# Patient Record
Sex: FEMALE | Race: WHITE | Employment: FULL TIME | URBAN - METROPOLITAN AREA
[De-identification: names, ages, dates, MRNs, and addresses within clinical notes are randomized per-mention and may not be internally consistent; named-entity substitution may affect disease eponyms.]

---

## 2018-01-18 ENCOUNTER — OFFICE VISIT (OUTPATIENT)
Dept: ORTHOPEDIC SURGERY | Age: 23
End: 2018-01-18

## 2018-01-18 VITALS
WEIGHT: 150 LBS | HEART RATE: 76 BPM | DIASTOLIC BLOOD PRESSURE: 89 MMHG | SYSTOLIC BLOOD PRESSURE: 135 MMHG | BODY MASS INDEX: 22.22 KG/M2 | HEIGHT: 69 IN

## 2018-01-18 DIAGNOSIS — S16.1XXA STRAIN OF NECK MUSCLE, INITIAL ENCOUNTER: ICD-10-CM

## 2018-01-18 DIAGNOSIS — M25.511 RIGHT SHOULDER PAIN, UNSPECIFIED CHRONICITY: ICD-10-CM

## 2018-01-18 DIAGNOSIS — S46.911A STRAIN OF RIGHT SHOULDER, INITIAL ENCOUNTER: Primary | ICD-10-CM

## 2018-01-18 PROCEDURE — 99203 OFFICE O/P NEW LOW 30 MIN: CPT | Performed by: ORTHOPAEDIC SURGERY

## 2018-01-18 RX ORDER — CYCLOBENZAPRINE HCL 10 MG
10 TABLET ORAL 3 TIMES DAILY PRN
Qty: 20 TABLET | Refills: 0 | Status: SHIPPED | OUTPATIENT
Start: 2018-01-18 | End: 2018-01-28

## 2018-01-18 RX ORDER — METHYLPREDNISOLONE 4 MG/1
TABLET ORAL
Qty: 1 KIT | Refills: 0 | Status: SHIPPED | OUTPATIENT
Start: 2018-01-18 | End: 2018-01-24

## 2018-01-18 NOTE — PROGRESS NOTES
Michael Strickland  [id number]  January 18, 2018    Chief Complaint   Patient presents with    Shoulder Pain     Right shoulder pain after waking up this morning,          History: The patient is a  35-year-old female here today for evaluation regarding her right shoulder/neck pain. The patient reportedly woke up this morning in severe pain and she was unable to move her neck right shoulder. She is a right-hand dominant individual.  She works in a [employer]. She did take 600 mg ibuprofen this morning and reports this has started to improve her pain. She rates her pain 6/10 today. She denies any associated numbness, tingling, or weakness. She has not had any of his symptoms previously. The patient's  past medical history, medications, allergies,  family history, social history, and review of systems have been reviewed, and dated and are recorded in the chart. /89   Pulse 76   Ht 5' 9\" (1.753 m)   Wt 150 lb (68 kg)   Breastfeeding? No   BMI 22.15 kg/m²     Physical: The patient presents today in no acute distress. She is alert and oriented to person, place and time. She displays appropriate mood and affect. She is well dressed, nourished and  groomed. She has a normal affect. Examination of the neck reveals no evidence of tenderness. Spurling's sign is negative. There is a diffuse tenderness to palpation about her right trapezius musculature. Her cervical range of motion is slightly decreased secondary to pain in all planes. Active range of motion of the right shoulder is: 175 degrees abduction, 175 degrees forward flexion, 45 degrees of external rotation and internal rotation to L1. Passive range of motion of the right shoulder is: 180 degrees abduction, 180 degrees forward flexion, 50 degrees of external rotation and internal rotation to T11. Active and passive range of motion of the opposite shoulder is full.  Examination of the right shoulder reveals negative Neer and Camacho' impingement

## 2019-05-27 ENCOUNTER — HOSPITAL ENCOUNTER (INPATIENT)
Age: 24
LOS: 6 days | Discharge: HOME | DRG: 866 | End: 2019-06-02
Attending: EMERGENCY MEDICINE | Admitting: INTERNAL MEDICINE
Payer: COMMERCIAL

## 2019-05-27 ENCOUNTER — APPOINTMENT (OUTPATIENT)
Dept: GENERAL RADIOLOGY | Age: 24
DRG: 866 | End: 2019-05-27
Payer: COMMERCIAL

## 2019-05-27 DIAGNOSIS — R50.9 FEVER, UNSPECIFIED FEVER CAUSE: Primary | ICD-10-CM

## 2019-05-27 PROBLEM — R51.9 HEADACHE: Status: ACTIVE | Noted: 2019-05-27

## 2019-05-27 PROBLEM — R53.83 FATIGUE: Status: ACTIVE | Noted: 2019-05-27

## 2019-05-27 PROBLEM — R11.0 NAUSEA: Status: ACTIVE | Noted: 2019-05-27

## 2019-05-27 LAB
A/G RATIO: 1.4 (ref 1.1–2.2)
ALBUMIN SERPL-MCNC: 3.8 G/DL (ref 3.4–5)
ALP BLD-CCNC: 97 U/L (ref 40–129)
ALT SERPL-CCNC: 62 U/L (ref 10–40)
ANION GAP SERPL CALCULATED.3IONS-SCNC: 10 MMOL/L (ref 3–16)
AST SERPL-CCNC: 76 U/L (ref 15–37)
BASOPHILS ABSOLUTE: 0 K/UL (ref 0–0.2)
BASOPHILS RELATIVE PERCENT: 0.4 %
BILIRUB SERPL-MCNC: 0.5 MG/DL (ref 0–1)
BILIRUBIN URINE: NEGATIVE
BLOOD, URINE: NEGATIVE
BUN BLDV-MCNC: 8 MG/DL (ref 7–20)
CALCIUM SERPL-MCNC: 8.8 MG/DL (ref 8.3–10.6)
CHLORIDE BLD-SCNC: 104 MMOL/L (ref 99–110)
CLARITY: CLEAR
CO2: 20 MMOL/L (ref 21–32)
COLOR: YELLOW
CREAT SERPL-MCNC: <0.5 MG/DL (ref 0.6–1.1)
EOSINOPHILS ABSOLUTE: 0 K/UL (ref 0–0.6)
EOSINOPHILS RELATIVE PERCENT: 0 %
GFR AFRICAN AMERICAN: >60
GFR NON-AFRICAN AMERICAN: >60
GLOBULIN: 2.8 G/DL
GLUCOSE BLD-MCNC: 164 MG/DL (ref 70–99)
GLUCOSE URINE: NEGATIVE MG/DL
HCG QUALITATIVE: NEGATIVE
HCT VFR BLD CALC: 36.9 % (ref 36–48)
HEMOGLOBIN: 12.7 G/DL (ref 12–16)
KETONES, URINE: NEGATIVE MG/DL
LACTIC ACID, SEPSIS: 0.8 MMOL/L (ref 0.4–1.9)
LACTIC ACID, SEPSIS: 0.9 MMOL/L (ref 0.4–1.9)
LEUKOCYTE ESTERASE, URINE: NEGATIVE
LYMPHOCYTES ABSOLUTE: 0.5 K/UL (ref 1–5.1)
LYMPHOCYTES RELATIVE PERCENT: 19 %
MCH RBC QN AUTO: 30.2 PG (ref 26–34)
MCHC RBC AUTO-ENTMCNC: 34.4 G/DL (ref 31–36)
MCV RBC AUTO: 88 FL (ref 80–100)
MICROSCOPIC EXAMINATION: NORMAL
MONOCYTES ABSOLUTE: 0.1 K/UL (ref 0–1.3)
MONOCYTES RELATIVE PERCENT: 4.8 %
NEUTROPHILS ABSOLUTE: 1.8 K/UL (ref 1.7–7.7)
NEUTROPHILS RELATIVE PERCENT: 75.8 %
NITRITE, URINE: NEGATIVE
PDW BLD-RTO: 12.4 % (ref 12.4–15.4)
PH UA: 6.5 (ref 5–8)
PLATELET # BLD: 109 K/UL (ref 135–450)
PMV BLD AUTO: 8.5 FL (ref 5–10.5)
POTASSIUM SERPL-SCNC: 3.8 MMOL/L (ref 3.5–5.1)
PROTEIN UA: NEGATIVE MG/DL
RBC # BLD: 4.19 M/UL (ref 4–5.2)
SODIUM BLD-SCNC: 134 MMOL/L (ref 136–145)
SPECIFIC GRAVITY UA: 1.01 (ref 1–1.03)
TOTAL PROTEIN: 6.6 G/DL (ref 6.4–8.2)
URINE REFLEX TO CULTURE: NORMAL
URINE TYPE: NORMAL
UROBILINOGEN, URINE: 1 E.U./DL
WBC # BLD: 2.4 K/UL (ref 4–11)

## 2019-05-27 PROCEDURE — 99255 IP/OBS CONSLTJ NEW/EST HI 80: CPT | Performed by: INTERNAL MEDICINE

## 2019-05-27 PROCEDURE — 36415 COLL VENOUS BLD VENIPUNCTURE: CPT

## 2019-05-27 PROCEDURE — 84703 CHORIONIC GONADOTROPIN ASSAY: CPT

## 2019-05-27 PROCEDURE — 2580000003 HC RX 258: Performed by: INTERNAL MEDICINE

## 2019-05-27 PROCEDURE — 6370000000 HC RX 637 (ALT 250 FOR IP): Performed by: INTERNAL MEDICINE

## 2019-05-27 PROCEDURE — 85025 COMPLETE CBC W/AUTO DIFF WBC: CPT

## 2019-05-27 PROCEDURE — 99285 EMERGENCY DEPT VISIT HI MDM: CPT

## 2019-05-27 PROCEDURE — 96374 THER/PROPH/DIAG INJ IV PUSH: CPT

## 2019-05-27 PROCEDURE — 1200000000 HC SEMI PRIVATE

## 2019-05-27 PROCEDURE — 83605 ASSAY OF LACTIC ACID: CPT

## 2019-05-27 PROCEDURE — 81003 URINALYSIS AUTO W/O SCOPE: CPT

## 2019-05-27 PROCEDURE — 87040 BLOOD CULTURE FOR BACTERIA: CPT

## 2019-05-27 PROCEDURE — 96361 HYDRATE IV INFUSION ADD-ON: CPT

## 2019-05-27 PROCEDURE — 6360000002 HC RX W HCPCS: Performed by: NURSE PRACTITIONER

## 2019-05-27 PROCEDURE — 71046 X-RAY EXAM CHEST 2 VIEWS: CPT

## 2019-05-27 PROCEDURE — 6360000002 HC RX W HCPCS: Performed by: INTERNAL MEDICINE

## 2019-05-27 PROCEDURE — 6370000000 HC RX 637 (ALT 250 FOR IP): Performed by: NURSE PRACTITIONER

## 2019-05-27 PROCEDURE — 2580000003 HC RX 258: Performed by: NURSE PRACTITIONER

## 2019-05-27 PROCEDURE — 87207 SMEAR SPECIAL STAIN: CPT

## 2019-05-27 PROCEDURE — 80053 COMPREHEN METABOLIC PANEL: CPT

## 2019-05-27 RX ORDER — SODIUM CHLORIDE 0.9 % (FLUSH) 0.9 %
10 SYRINGE (ML) INJECTION PRN
Status: DISCONTINUED | OUTPATIENT
Start: 2019-05-27 | End: 2019-06-02 | Stop reason: HOSPADM

## 2019-05-27 RX ORDER — OSELTAMIVIR PHOSPHATE 75 MG/1
75 CAPSULE ORAL 2 TIMES DAILY
Status: ON HOLD | COMMUNITY
End: 2019-06-02 | Stop reason: HOSPADM

## 2019-05-27 RX ORDER — MORPHINE SULFATE 2 MG/ML
2 INJECTION, SOLUTION INTRAMUSCULAR; INTRAVENOUS EVERY 4 HOURS PRN
Status: COMPLETED | OUTPATIENT
Start: 2019-05-27 | End: 2019-05-28

## 2019-05-27 RX ORDER — KETOROLAC TROMETHAMINE 30 MG/ML
30 INJECTION, SOLUTION INTRAMUSCULAR; INTRAVENOUS EVERY 6 HOURS PRN
Status: DISCONTINUED | OUTPATIENT
Start: 2019-05-27 | End: 2019-05-28

## 2019-05-27 RX ORDER — ACETAMINOPHEN 325 MG/1
650 TABLET ORAL EVERY 4 HOURS PRN
Status: DISCONTINUED | OUTPATIENT
Start: 2019-05-27 | End: 2019-05-28

## 2019-05-27 RX ORDER — KETOROLAC TROMETHAMINE 30 MG/ML
30 INJECTION, SOLUTION INTRAMUSCULAR; INTRAVENOUS ONCE
Status: COMPLETED | OUTPATIENT
Start: 2019-05-27 | End: 2019-05-27

## 2019-05-27 RX ORDER — SODIUM CHLORIDE 0.9 % (FLUSH) 0.9 %
10 SYRINGE (ML) INJECTION EVERY 12 HOURS SCHEDULED
Status: DISCONTINUED | OUTPATIENT
Start: 2019-05-27 | End: 2019-06-02 | Stop reason: HOSPADM

## 2019-05-27 RX ORDER — ACETAMINOPHEN 500 MG
1000 TABLET ORAL ONCE
Status: COMPLETED | OUTPATIENT
Start: 2019-05-27 | End: 2019-05-27

## 2019-05-27 RX ORDER — 0.9 % SODIUM CHLORIDE 0.9 %
30 INTRAVENOUS SOLUTION INTRAVENOUS ONCE
Status: COMPLETED | OUTPATIENT
Start: 2019-05-27 | End: 2019-05-27

## 2019-05-27 RX ORDER — ONDANSETRON 2 MG/ML
4 INJECTION INTRAMUSCULAR; INTRAVENOUS EVERY 6 HOURS PRN
Status: DISCONTINUED | OUTPATIENT
Start: 2019-05-27 | End: 2019-06-02 | Stop reason: HOSPADM

## 2019-05-27 RX ADMIN — Medication 10 ML: at 10:03

## 2019-05-27 RX ADMIN — KETOROLAC TROMETHAMINE 30 MG: 30 INJECTION, SOLUTION INTRAMUSCULAR; INTRAVENOUS at 15:14

## 2019-05-27 RX ADMIN — KETOROLAC TROMETHAMINE 30 MG: 30 INJECTION, SOLUTION INTRAMUSCULAR at 03:22

## 2019-05-27 RX ADMIN — ACETAMINOPHEN 650 MG: 325 TABLET, FILM COATED ORAL at 18:21

## 2019-05-27 RX ADMIN — MORPHINE SULFATE 2 MG: 2 INJECTION, SOLUTION INTRAMUSCULAR; INTRAVENOUS at 19:41

## 2019-05-27 RX ADMIN — KETOROLAC TROMETHAMINE 30 MG: 30 INJECTION, SOLUTION INTRAMUSCULAR; INTRAVENOUS at 23:26

## 2019-05-27 RX ADMIN — Medication 10 ML: at 15:14

## 2019-05-27 RX ADMIN — SODIUM CHLORIDE 2040 ML: 9 INJECTION, SOLUTION INTRAVENOUS at 03:21

## 2019-05-27 RX ADMIN — Medication 10 ML: at 19:41

## 2019-05-27 RX ADMIN — ACETAMINOPHEN 1000 MG: 500 TABLET, FILM COATED ORAL at 09:28

## 2019-05-27 ASSESSMENT — ENCOUNTER SYMPTOMS
NAUSEA: 0
CHEST TIGHTNESS: 0
ABDOMINAL PAIN: 0
COUGH: 1
VOMITING: 0
DIARRHEA: 0
SHORTNESS OF BREATH: 0
PHOTOPHOBIA: 1

## 2019-05-27 ASSESSMENT — PAIN SCALES - GENERAL
PAINLEVEL_OUTOF10: 4
PAINLEVEL_OUTOF10: 6
PAINLEVEL_OUTOF10: 0
PAINLEVEL_OUTOF10: 10
PAINLEVEL_OUTOF10: 6
PAINLEVEL_OUTOF10: 6
PAINLEVEL_OUTOF10: 10
PAINLEVEL_OUTOF10: 7

## 2019-05-27 ASSESSMENT — PAIN DESCRIPTION - PAIN TYPE: TYPE: ACUTE PAIN

## 2019-05-27 ASSESSMENT — PAIN DESCRIPTION - DESCRIPTORS: DESCRIPTORS: ACHING;CONSTANT

## 2019-05-27 ASSESSMENT — PAIN DESCRIPTION - LOCATION: LOCATION: GENERALIZED

## 2019-05-27 NOTE — PROGRESS NOTES
Vitals assessed; temp low grade. PRN Tylenol given for \"body aches\". Patient denies any needs. Call light within reach.

## 2019-05-27 NOTE — ED PROVIDER NOTES
[address]        Pt Name: Nga Dye  MRN: [id number]  [place] 1995  Date of evaluation: 5/27/2019  Provider: KAREN Vanessa CNP  PCP: No primary care provider on file. This patient was seen and evaluated by the attending physician Kyler Avitia MD.      [address] [place] [address]       Chief Complaint   Patient presents with    Fever     Patient presents to ER with complaints of fever and flu-like symptoms. Recently seen at urgent care and given oseltamivir. HISTORY OF PRESENT ILLNESS   (Location/Symptom, Timing/Onset, Context/Setting, Quality, Duration, Modifying Factors, Severity)  Note limiting factors. Nga Dye is a 21 y.o. female presents to the emergency department with complaint of fever, headaches, chills, and fatigue for 3 days. Patient was seen at urgent care and prescribed Tamiflu without a nasal swab recently. States that this medication is making her feel worse. She did travel back from [place] 2 months ago and has been feeling well up until the past 1-1/2 weeks. She reports photophobia without visual disturbance or aura. States that her headache does wax and wane, worse with fever. Burning in her eyes. Denies neck pain or meningeal symptoms. Denies any lightheadedness, dizziness, visual disturbances. No chest pain or pressure. No neck or back pain. No shortness of breath. No abdominal pain, nausea, vomiting, diarrhea, constipation, or dysuria. No rash. Nursing Notes were all reviewed and agreed with or any disagreements were addressed  in the HPI. REVIEW OF SYSTEMS    (2-9 systems for level 4, 10 or more for level 5)     Review of Systems   Constitutional: Positive for chills, fatigue and fever. Negative for activity change. Eyes: Positive for photophobia. Respiratory: Positive for cough. Negative for chest tightness and shortness of breath. Cardiovascular: Negative for chest pain. Gastrointestinal: Negative for abdominal pain, diarrhea, nausea and vomiting. Genitourinary: Negative for dysuria. Neurological: Positive for headaches. All other systems reviewed and are negative. Positives and Pertinent negatives as per HPI. Except as noted abovein the ROS, all other systems were reviewed and negative. PAST MEDICAL HISTORY   History reviewed. No pertinent past medical history. SURGICAL HISTORY   History reviewed. No pertinent surgical history. [address]       Current Discharge Medication List      CONTINUE these medications which have NOT CHANGED    Details   oseltamivir (TAMIFLU) 75 MG capsule Take 75 mg by mouth 2 times daily               ALLERGIES     Patient has no known allergies. FAMILYHISTORY     History reviewed. No pertinent family history.        SOCIAL HISTORY       Social History     Socioeconomic History    Marital status:      Spouse name: None    Number of children: None    Years of education: None    Highest education level: None   Occupational History    None   Social Needs    Financial resource strain: None    Food insecurity:     Worry: None     Inability: None    Transportation needs:     Medical: None     Non-medical: None   Tobacco Use    Smoking status: Never Smoker    Smokeless tobacco: Never Used   Substance and Sexual Activity    Alcohol use: None    Drug use: None    Sexual activity: None   Lifestyle    Physical activity:     Days per week: None     Minutes per session: None    Stress: None   Relationships    Social connections:     Talks on phone: None     Gets together: None     Attends Yazdanism service: None     Active member of club or organization: None     Attends meetings of clubs or organizations: None     Relationship status: None    Intimate partner violence:     Fear of current or ex partner: None     Emotionally abused: None     Physically abused: None     Forced sexual activity: None   Other Topics Concern    None   Social History Narrative    None       SCREENINGS             PHYSICAL EXAM    (up to 7 for level 4, 8 or more for level 5)     ED Triage Vitals [05/27/19 0140]   BP Temp Temp Source Pulse Resp SpO2 Height Weight   128/87 101.9 °F (38.8 °C) Oral 130 18 97 % 5' 9\" (1.753 m) 150 lb (68 kg)       Physical Exam   Constitutional: She is oriented to person, place, and time. She appears well-developed and well-nourished. No distress. HENT:   Head: Normocephalic and atraumatic. Right Ear: External ear normal.   Left Ear: External ear normal.   Eyes: Pupils are equal, round, and reactive to light. Conjunctivae and EOM are normal. Right eye exhibits no discharge. Left eye exhibits no discharge. Neck: Normal range of motion. Neck supple. No JVD present. Cardiovascular: Normal rate and regular rhythm. Exam reveals no friction rub. No murmur heard. Pulmonary/Chest: Effort normal and breath sounds normal. No stridor. No respiratory distress. She has no wheezes. Abdominal: Soft. She exhibits no mass. There is no tenderness. Musculoskeletal: Normal range of motion. Neurological: She is alert and oriented to person, place, and time. She has normal strength. No cranial nerve deficit or sensory deficit. GCS eye subscore is 4. GCS verbal subscore is 5. GCS motor subscore is 6. Skin: Skin is warm and dry. She is not diaphoretic. No pallor. Psychiatric: She has a normal mood and affect. Her behavior is normal.   Nursing note and vitals reviewed.       DIAGNOSTIC RESULTS   LABS:    Labs Reviewed   CBC WITH AUTO DIFFERENTIAL - Abnormal; Notable for the following components:       Result Value    WBC 2.4 (*)     Platelets 583 (*)     Lymphocytes # 0.5 (*)     All other components within normal limits    Narrative:     Performed at:  [facility] [address], [place], [address]   Phone [phone]   COMPREHENSIVE METABOLIC PANEL - Abnormal; Notable for noted below, none     Procedures    CRITICAL CARE TIME   The total critical care time spent while evaluating and treating this patient was at least 32 minutes. This excludes time spent doing separately billable procedures. This includes time at the bedside, data interpretation, medication management, obtaining critical history from collateral sources if the patient is unable to provide it directly, and physician consultation. Specifics of interventions taken and potentially life-threatening diagnostic considerations are listed above in the medical decision making.       CONSULTS:  IP CONSULT TO INFECTIOUS DISEASES      EMERGENCY DEPARTMENT COURSE and DIFFERENTIALDIAGNOSIS/MDM:   Vitals:    Vitals:    05/27/19 0949 05/27/19 1000 05/27/19 1359 05/27/19 1818   BP: 111/75 111/75 112/78 115/77   Pulse: 103 103 96 99   Resp: 16  16 16   Temp: 101.9 °F (38.8 °C)  99 °F (37.2 °C) 100.2 °F (37.9 °C)   TempSrc: Oral  Oral Oral   SpO2:   99% 99%   Weight:       Height:           Patient was given thefollowing medications:  Medications   sodium chloride flush 0.9 % injection 10 mL (10 mLs Intravenous Given 5/27/19 1003)   sodium chloride flush 0.9 % injection 10 mL (10 mLs Intravenous Given 5/27/19 1514)   magnesium hydroxide (MILK OF MAGNESIA) 400 MG/5ML suspension 30 mL (has no administration in time range)   ondansetron (ZOFRAN) injection 4 mg (has no administration in time range)   enoxaparin (LOVENOX) injection 40 mg (has no administration in time range)   acetaminophen (TYLENOL) tablet 650 mg (650 mg Oral Given 5/27/19 1821)   ketorolac (TORADOL) injection 30 mg (30 mg Intravenous Given 5/27/19 1514)   morphine (PF) injection 2 mg (has no administration in time range)   0.9 % sodium chloride bolus (0 mL/kg × 68 kg Intravenous Stopped 5/27/19 1486)   ketorolac (TORADOL) injection 30 mg (30 mg Intravenous Given 5/27/19 7132)   acetaminophen (TYLENOL) tablet 1,000 mg (1,000 mg Oral Given 5/27/19 0928)       Briefly, this

## 2019-05-27 NOTE — ED PROVIDER NOTES
Alcohol use: None    Drug use: None    Sexual activity: None   Lifestyle    Physical activity:     Days per week: None     Minutes per session: None    Stress: None   Relationships    Social connections:     Talks on phone: None     Gets together: None     Attends Yazidism service: None     Active member of club or organization: None     Attends meetings of clubs or organizations: None     Relationship status: None    Intimate partner violence:     Fear of current or ex partner: None     Emotionally abused: None     Physically abused: None     Forced sexual activity: None   Other Topics Concern    None   Social History Narrative    None       REVIEW OF SYSTEMS    Constitutional:  Fever and chills  Eyes:  Denies photophobia or discharge   HENT:  Denies sore throat or ear pain   Respiratory:  Denies cough or shortness of breath   Cardiovascular:  Denies chest pain, palpitations or swelling   GI:  Denies abdominal pain, nausea, vomiting, or diarrhea   Musculoskeletal:  Denies back pain   Skin:  Denies rash   Neurologic:  Denies headache, focal weakness or sensory changes   Endocrine:  Denies polyuria or polydypsia   Lymphatic:  Denies swollen glands   Psychiatric:  Denies depression, suicidal ideation or homicidal ideation   All systems negative except as marked. PHYSICAL EXAM    VITAL SIGNS: /69   Pulse 87   Temp 99 °F (37.2 °C) (Oral)   Resp 16   Ht 5' 9\" (1.753 m)   Wt 150 lb (68 kg)   LMP 04/28/2019   SpO2 97%   BMI 22.15 kg/m²    Constitutional:  Well developed, Well nourished, No acute distress, Non-toxic appearance. HENT:  Normocephalic, Atraumatic, Bilateral external ears normal, Oropharynx moist, No oral exudates, Nose normal. Neck- Normal range of motion, No tenderness, Supple, No stridor. Eyes:  PERRL, EOMI, Conjunctiva normal, No discharge. Respiratory:  Normal breath sounds, No respiratory distress, No wheezing, No chest tenderness.    Cardiovascular:  Normal heart rate, Normal rhythm, No murmurs, No rubs, No gallops. GI:  Bowel sounds normal, Soft, No tenderness, No masses, No pulsatile masses. Musculoskeletal:  Intact distal pulses, No edema, No tenderness, No cyanosis, No clubbing. Good range of motion in all major joints. No tenderness to palpation or major deformities noted. Back- No tenderness. Integument:  Warm, Dry, No erythema, No rash. Lymphatic:  No lymphadenopathy noted. Neurologic:  Alert & oriented x 3, Normal motor function, Normal sensory function, No focal deficits noted. Psychiatric:  Affect normal, Judgment normal, Mood normal.     EKG        RADIOLOGY    XR CHEST STANDARD (2 VW)   Final Result   No evidence of acute cardiopulmonary disease.                PROCEDURES    Labs Reviewed   CBC WITH AUTO DIFFERENTIAL - Abnormal; Notable for the following components:       Result Value    WBC 2.4 (*)     Platelets 521 (*)     Lymphocytes # 0.5 (*)     All other components within normal limits    Narrative:     Performed at:  [facility] [address], [place], [address]   Phone [phone]   COMPREHENSIVE METABOLIC PANEL - Abnormal; Notable for the following components:    Sodium 134 (*)     CO2 20 (*)     Glucose 164 (*)     CREATININE <0.5 (*)     ALT 62 (*)     AST 76 (*)     All other components within normal limits    Narrative:     Performed at:  [facility] [address], [place] [facility] [place]   Phone [phone]   CULTURE BLOOD #2   CULTURE BLOOD #1   MALARIA SMEAR   HCG, SERUM, QUALITATIVE    Narrative:     Performed at:  [facility] [address], [place], [address]   Phone [phone]   LACTATE, SEPSIS    Narrative:     Performed at:  [facility] [address], [place] [facility] [place]   Phone [phone]   URINE RT REFLEX TO CULTURE    Narrative:     Performed at:  [facility]

## 2019-05-27 NOTE — PROGRESS NOTES
Patient has arrived to unit in stable condition. Vitals assessed; temp elevated but has decreased since last reading. Patient is awake, alert and oriented. Respirations are easy and unlabored. Patient does not appear to be in distress. Patient reports feeling \"weak and exhausted\". Patient provided with ice water, crackers and jello. Patient oriented to room and call light. Call light within reach.

## 2019-05-27 NOTE — H&P
Hospital Medicine History & Physical      PCP: No primary care provider on file. Date of Admission: 5/27/2019    Date of Service: Pt seen/examined on 5/27/2019  and Admitted to Inpatient with expected LOS greater than two midnights due to medical therapy. Chief Complaint:    Chief Complaint   Patient presents with    Fever     Patient presents to ER with complaints of fever and flu-like symptoms. Recently seen at urgent care and given oseltamivir. History Of Present Illness: The patient is a 21 y.o. female  with no significant past medical history who presented with 5 day history of intermittent fevers and chills with associated fatigue, headaches with nausea but no vomiting. She reports the fevers to be coming twice a day almost every 12 hours. Of note she had a sore throat and rhinorrhea about a week ago which has since resolved. She reports to have for her to travel to [place] on relief mission and returned 2 months ago. She was there for 9 months. Of note 5-6 months ago she had a tick bite at which time she developed fevers with fatigue and reports to have received some treatment for that with resolution of symptoms. She did travel for about 4 days to malaria area in [place] earlier on in her 9 months mission. However she never manifested any malaria symptoms at the time. She also reports to have had a sick contact at home with flulike symptoms about a week ago  She has no rash, no diarrhea, no cough or production, no chest pains, no abdominal pains. She reports that she did not get flu shot last season as she was away. Past Medical History:    History reviewed. No pertinent past medical history. Past Surgical History:    History reviewed. No pertinent surgical history. Medications Prior to Admission:    Prior to Admission medications    Medication Sig Start Date End Date Taking?  Authorizing Provider   oseltamivir (TAMIFLU) 75 MG capsule Take 75 mg by mouth 2 times daily   Yes Historical Provider, MD       Allergies:  Patient has no known allergies. Social History:  The patient currently lives with family    TOBACCO:   reports that she has never smoked. She has never used smokeless tobacco.  ETOH:   has no alcohol history on file. Family History:  Reviewed in detail and negative for DM, Early CAD, Cancer, CVA. Positive as follows:    History reviewed. No pertinent family history. REVIEW OF SYSTEMS:   Positive for fevers and chills, fatigue, headache, nausea and as noted in the HPI. All other systems reviewed and negative. PHYSICAL EXAM:    /85   Pulse 105   Temp 103.2 °F (39.6 °C) (Oral)   Resp 16   Ht 5' 9\" (1.753 m)   Wt 150 lb (68 kg)   LMP 04/28/2019   SpO2 99%   BMI 22.15 kg/m²     General appearance: No apparent distress appears stated age and cooperative. HEENT Normal cephalic, atraumatic without obvious deformity. Pupils equal, round, and reactive to light. Extra ocular muscles intact. Conjunctivae/corneas clear. Neck: Supple, No jugular venous distention/bruits. Lungs: Clear to auscultation, bilaterally without Rales/Wheezes/Rhonchi with good respiratory effort. Heart: Regular rate and rhythm with Normal S1/S2 without murmurs, rubs or gallops  Abdomen: Soft, non-tender or non-distended without rigidity or guarding and positive bowel sounds   Extremities: No clubbing, cyanosis, or edema bilaterally  Skin: Skin color, texture, turgor normal.  No rashes or lesions. Neurologic: Alert and oriented X 3, neurovascularly intact with sensory/motor intact upper extremities/lower extremities, bilaterally. Cranial nerves: II-XII intact, grossly non-focal.  Mental status: Alert, oriented, thought content appropriate.     CXR:  I have reviewed the CXR with the following interpretation: No evidence of cardiopulmonary disease      CBC   Recent Labs     05/27/19  0326   WBC 2.4*   HGB 12.7   HCT 36.9   *      RENAL  Recent Labs 05/27/19  0326   *   K 3.8      CO2 20*   BUN 8   CREATININE <0.5*     LFT'S  Recent Labs     05/27/19  0326   AST 76*   ALT 62*   BILITOT 0.5   ALKPHOS 97     COAG  No results for input(s): INR in the last 72 hours. CARDIAC ENZYMES  No results for input(s): CKTOTAL, CKMB, CKMBINDEX, TROPONINI in the last 72 hours. U/A:    Lab Results   Component Value Date    COLORU YELLOW 05/27/2019    CLARITYU Clear 05/27/2019    SPECGRAV 1.010 05/27/2019    LEUKOCYTESUR Negative 05/27/2019    BLOODU Negative 05/27/2019    GLUCOSEU Negative 05/27/2019       ABG  No results found for: NKE8DJM, BEART, B3VGJSVQ, PHART, THGBART, SRH8EJR, PO2ART, [place]        Active Hospital Problems    Diagnosis Date Noted    Fever and chills [R50.9] 05/27/2019    Fatigue [R53.83] 05/27/2019    Headache [R51] 05/27/2019    Nausea [R11.0] 05/27/2019         ASSESSMENT/PLAN:   21 y.o. female  with no significant past medical history who presented with 5 day history of intermittent fevers and chills with associated fatigue, headaches, nausea but no vomiting the setting of travel to the malaria area giving consent for probable malaria infection. It may be a late manifestation with delayed presentation. There has been reports of vivax presenting upto 4years after leaving the endemic area. Plan:  -Request for malaria smear has been sent  -Supportive therapy for now with analgesics and antipyretics   -Infectious disease consult  -Given that symptoms have been going on for 4-5 days, I will hold off on Tamiflu.  -Antiemetics for nausea  -IV fluids for hydration and encourage oral intake      DVT Prophylaxis: Subcut in enoxaparin  Diet: DIET GENERAL;  Code Status: Full code         Paul Atkinson MD    Thank you No primary care provider on file. for the opportunity to be involved in this patient's care. If you have any questions or concerns please feel free to contact me at [phone].

## 2019-05-27 NOTE — PROGRESS NOTES
Assessment complete. VSS. Patient resting in bed. Family at bedside. Respirations even and easy. Call light in reach. Patient complaining of pain. PRN pain medication given. Patient unable to eat dinner tray. Patient requesting crackers at this time. Crackers provided to patient. No other needs expressed at this time. Will continue to monitor.

## 2019-05-27 NOTE — CONSULTS
Infectious Diseases Inpatient Consult Note    Reason for Consult:   Fever   Requesting Physician:   Dr Esha Welch  Primary Care Physician:  No primary care provider on file. History Obtained From:   Pt, EPIC    Admit Date: 5/27/2019  Hospital Day: 1    CHIEF COMPLAINT:       Chief Complaint   Patient presents with    Fever     Patient presents to ER with complaints of fever and flu-like symptoms. Recently seen at urgent care and given oseltamivir. HISTORY OF PRESENT ILLNESS:      20 yo woman with no PMH    Presents with f/c, fatigue, HA, nausea, loss of appetitie. Pt had resp congestion 1-2 week ago, resolved. Developed fever / fatigue, muscle pain 4 days ago. Mild HA  No resp sx. No abd pain. No diarrhea. No rash    Recent travel, in [place] x 9mo, returned 2 mo ago. Had unknown immunization prior to travel. No malaria prophylaxis. (5 mo ago had tick bite with spotted fever illness [fever, aches, lesion at bite, treated with doxycycline and illness resolved). + recent sick contact - friend had resp sx,  with resp sx.  2 yo in household well    Tm 103.2. WBC 2.4, plt 109k. UA neg. CXR neg. BC sent. Feels 'tired'. Past Medical History:    History reviewed. No pertinent past medical history. Past Surgical History:    History reviewed. No pertinent surgical history. Current Medications:     sodium chloride flush  10 mL Intravenous 2 times per day    [START ON 5/28/2019] enoxaparin  40 mg Subcutaneous Daily       Allergies:  Patient has no known allergies. Social History:    TOBACCO:    None   ETOH:    Infrequent   DRUGS:   None   MARITAL STATUS:    - no other sexual contacts    Family History:   No immunodeficiency    REVIEW OF SYSTEMS:    + fever / chills / sweats. No weight loss. No visual change, eye pain, eye discharge. No oral lesion, sore throat, dysphagia. Denies cough / sputum. Denies chest pain, palpitations. Denies n / v / abd pain.   No diarrhea. Denies dysuria or change in urinary function. Denies joint swelling or pain.  + myalgia  Denies skin changes, itching  Denies focal weakness, sensory change or other neurologic symptom    Denies new / worse depression, psychiatric symptoms    PHYSICAL EXAM:      Vitals:    /78   Pulse 96   Temp 99 °F (37.2 °C) (Oral)   Resp 16   Ht 5' 9\" (1.753 m)   Wt 150 lb (68 kg)   LMP 2019   SpO2 99%   BMI 22.15 kg/m²     GENERAL: No apparent distress. HEENT: Membranes moist, no oral lesion  NECK:  Supple  LUNGS: Clear b/l, no rales, no dullness  CARDIAC: RRR, no murmur appreciated  ABD:  + BS, soft / NT  EXT:  No rash, no edema, no lesions  NEURO: No focal neurologic findings  PSYCH: Orientation, sensorium, mood normal  LINES:  Peripheral iv    DATA:    Lab Results   Component Value Date    WBC 2.4 (L) 2019    HGB 12.7 2019    HCT 36.9 2019    MCV 88.0 2019     (L) 2019     Lab Results   Component Value Date    CREATININE <0.5 (L) 2019    BUN 8 2019     (L) 2019    K 3.8 2019     2019    CO2 20 (L) 2019       Hepatic Function Panel:   Lab Results   Component Value Date    ALKPHOS 97 2019    ALT 62 2019    AST 76 2019    PROT 6.6 2019    BILITOT 0.5 2019    LABALBU 3.8 2019       Micro:   BC sent    Imagin/27 CXR neg    IMPRESSION:      Patient Active Problem List   Diagnosis    Fever and chills    Fatigue    Headache    Nausea       Fever - leukopenia - recent travel     Viral syndrome (returned end of March, longer incubation than would see with may common viral illnesses including Dengue, Chikungunya, Yellow Fever,tick borne rickettsial infections, Choleria, Brucellosis). No diarrhea illness, no rash.       R/o EBV, CMV (no HIV RF / exposure)  R/o malaria  R/o typhoid fever    RECOMMENDATIONS:    Cont off antibiotic  Will f/u on pending tests - BC, malaria smear  Check HIV ab, CMV serology, EBV serology    Discussed with pt,  - Zo Leslie MD

## 2019-05-27 NOTE — PROGRESS NOTES
Rounding on patient. Patient is in bed sleeping. Respirations are easy and unlabored. Patient does not appear to be in distress. Call light within reach.

## 2019-05-27 NOTE — PROGRESS NOTES
Rounding on patient. Patient in bed sleeping. Respirations are easy and unlabored. Patient does not appear to be in distress. Call light within reach.

## 2019-05-27 NOTE — ED NOTES
Paged Alina Tucker MD (On-call infectious disease) @ 5613 per Dr. Del Kwan MD @ [phone] per Dr. Brandon Petersen @! Panfilo Capps (infectious disease) @ 6210 per Dr. Brandon Petersen @ [phone] and spoke with Fausto enriquez Torres Valdez was not in his system but will still page Dr. Kirsty Capps for a consult.      [employer]  05/27/19 [address]  05/27/19 [address]  05/27/19 0530       [employer]  05/27/19 [address]  05/27/19 7523

## 2019-05-28 PROBLEM — D72.819 LEUCOPENIA: Status: ACTIVE | Noted: 2019-05-28

## 2019-05-28 LAB
A/G RATIO: 1.2 (ref 1.1–2.2)
ALBUMIN SERPL-MCNC: 3.7 G/DL (ref 3.4–5)
ALP BLD-CCNC: 135 U/L (ref 40–129)
ALT SERPL-CCNC: 72 U/L (ref 10–40)
ANION GAP SERPL CALCULATED.3IONS-SCNC: 13 MMOL/L (ref 3–16)
AST SERPL-CCNC: 70 U/L (ref 15–37)
BASOPHILS ABSOLUTE: 0 K/UL (ref 0–0.2)
BASOPHILS RELATIVE PERCENT: 1.2 %
BILIRUB SERPL-MCNC: 0.9 MG/DL (ref 0–1)
BUN BLDV-MCNC: 4 MG/DL (ref 7–20)
CALCIUM SERPL-MCNC: 8.9 MG/DL (ref 8.3–10.6)
CHLORIDE BLD-SCNC: 104 MMOL/L (ref 99–110)
CO2: 20 MMOL/L (ref 21–32)
CREAT SERPL-MCNC: <0.5 MG/DL (ref 0.6–1.1)
EOSINOPHILS ABSOLUTE: 0 K/UL (ref 0–0.6)
EOSINOPHILS RELATIVE PERCENT: 0 %
GFR AFRICAN AMERICAN: >60
GFR NON-AFRICAN AMERICAN: >60
GLOBULIN: 3.1 G/DL
GLUCOSE BLD-MCNC: 103 MG/DL (ref 70–99)
HCT VFR BLD CALC: 38 % (ref 36–48)
HEMOGLOBIN: 13.1 G/DL (ref 12–16)
LYMPHOCYTES ABSOLUTE: 1 K/UL (ref 1–5.1)
LYMPHOCYTES RELATIVE PERCENT: 43.6 %
MALARIA PREP: NORMAL
MCH RBC QN AUTO: 29.8 PG (ref 26–34)
MCHC RBC AUTO-ENTMCNC: 34.4 G/DL (ref 31–36)
MCV RBC AUTO: 86.5 FL (ref 80–100)
MONOCYTES ABSOLUTE: 0.3 K/UL (ref 0–1.3)
MONOCYTES RELATIVE PERCENT: 10.8 %
NEUTROPHILS ABSOLUTE: 1 K/UL (ref 1.7–7.7)
NEUTROPHILS RELATIVE PERCENT: 44.4 %
PDW BLD-RTO: 12.5 % (ref 12.4–15.4)
PLATELET # BLD: 89 K/UL (ref 135–450)
PLATELET SLIDE REVIEW: ABNORMAL
PMV BLD AUTO: 9.2 FL (ref 5–10.5)
POTASSIUM REFLEX MAGNESIUM: 3.8 MMOL/L (ref 3.5–5.1)
PROCALCITONIN: 0.47 NG/ML (ref 0–0.15)
RBC # BLD: 4.39 M/UL (ref 4–5.2)
REPORT: NORMAL
RESPIRATORY PANEL PCR: NORMAL
SLIDE REVIEW: ABNORMAL
SODIUM BLD-SCNC: 137 MMOL/L (ref 136–145)
TOTAL PROTEIN: 6.8 G/DL (ref 6.4–8.2)
WBC # BLD: 2.3 K/UL (ref 4–11)

## 2019-05-28 PROCEDURE — 86665 EPSTEIN-BARR CAPSID VCA: CPT

## 2019-05-28 PROCEDURE — 87633 RESP VIRUS 12-25 TARGETS: CPT

## 2019-05-28 PROCEDURE — 6360000002 HC RX W HCPCS: Performed by: NURSE PRACTITIONER

## 2019-05-28 PROCEDURE — 87486 CHLMYD PNEUM DNA AMP PROBE: CPT

## 2019-05-28 PROCEDURE — 87581 M.PNEUMON DNA AMP PROBE: CPT

## 2019-05-28 PROCEDURE — 85025 COMPLETE CBC W/AUTO DIFF WBC: CPT

## 2019-05-28 PROCEDURE — 86663 EPSTEIN-BARR ANTIBODY: CPT

## 2019-05-28 PROCEDURE — G0378 HOSPITAL OBSERVATION PER HR: HCPCS

## 2019-05-28 PROCEDURE — 86644 CMV ANTIBODY: CPT

## 2019-05-28 PROCEDURE — 2580000003 HC RX 258: Performed by: INTERNAL MEDICINE

## 2019-05-28 PROCEDURE — 87798 DETECT AGENT NOS DNA AMP: CPT

## 2019-05-28 PROCEDURE — 84145 PROCALCITONIN (PCT): CPT

## 2019-05-28 PROCEDURE — 6360000002 HC RX W HCPCS: Performed by: INTERNAL MEDICINE

## 2019-05-28 PROCEDURE — 87207 SMEAR SPECIAL STAIN: CPT

## 2019-05-28 PROCEDURE — 6370000000 HC RX 637 (ALT 250 FOR IP): Performed by: INTERNAL MEDICINE

## 2019-05-28 PROCEDURE — 87536 HIV-1 QUANT&REVRSE TRNSCRPJ: CPT

## 2019-05-28 PROCEDURE — 80053 COMPREHEN METABOLIC PANEL: CPT

## 2019-05-28 PROCEDURE — 86618 LYME DISEASE ANTIBODY: CPT

## 2019-05-28 PROCEDURE — 86645 CMV ANTIBODY IGM: CPT

## 2019-05-28 PROCEDURE — 36415 COLL VENOUS BLD VENIPUNCTURE: CPT

## 2019-05-28 PROCEDURE — 1200000000 HC SEMI PRIVATE

## 2019-05-28 PROCEDURE — 99233 SBSQ HOSP IP/OBS HIGH 50: CPT | Performed by: INTERNAL MEDICINE

## 2019-05-28 PROCEDURE — 86664 EPSTEIN-BARR NUCLEAR ANTIGEN: CPT

## 2019-05-28 RX ORDER — ACETAMINOPHEN 325 MG/1
650 TABLET ORAL EVERY 6 HOURS PRN
Status: DISCONTINUED | OUTPATIENT
Start: 2019-05-28 | End: 2019-06-02 | Stop reason: HOSPADM

## 2019-05-28 RX ORDER — ACETAMINOPHEN 325 MG/1
650 TABLET ORAL EVERY 8 HOURS SCHEDULED
Status: DISCONTINUED | OUTPATIENT
Start: 2019-05-28 | End: 2019-06-01

## 2019-05-28 RX ADMIN — ACETAMINOPHEN 650 MG: 325 TABLET, FILM COATED ORAL at 19:13

## 2019-05-28 RX ADMIN — Medication 10 ML: at 22:05

## 2019-05-28 RX ADMIN — ACETAMINOPHEN 650 MG: 325 TABLET, FILM COATED ORAL at 13:30

## 2019-05-28 RX ADMIN — MORPHINE SULFATE 2 MG: 2 INJECTION, SOLUTION INTRAMUSCULAR; INTRAVENOUS at 00:14

## 2019-05-28 RX ADMIN — KETOROLAC TROMETHAMINE 30 MG: 30 INJECTION, SOLUTION INTRAMUSCULAR; INTRAVENOUS at 05:54

## 2019-05-28 RX ADMIN — ACETAMINOPHEN 650 MG: 325 TABLET, FILM COATED ORAL at 07:11

## 2019-05-28 RX ADMIN — Medication 10 ML: at 08:29

## 2019-05-28 RX ADMIN — Medication 10 ML: at 00:14

## 2019-05-28 RX ADMIN — Medication 10 ML: at 05:54

## 2019-05-28 ASSESSMENT — PAIN SCALES - GENERAL
PAINLEVEL_OUTOF10: 0
PAINLEVEL_OUTOF10: 0
PAINLEVEL_OUTOF10: 6
PAINLEVEL_OUTOF10: 3
PAINLEVEL_OUTOF10: 6
PAINLEVEL_OUTOF10: 6
PAINLEVEL_OUTOF10: 0
PAINLEVEL_OUTOF10: 1

## 2019-05-28 ASSESSMENT — ENCOUNTER SYMPTOMS
CHEST TIGHTNESS: 0
TROUBLE SWALLOWING: 0
COLOR CHANGE: 0
STRIDOR: 0
DIARRHEA: 0
NAUSEA: 0
ABDOMINAL PAIN: 0
EYE DISCHARGE: 0
SHORTNESS OF BREATH: 0
FACIAL SWELLING: 0
BLOOD IN STOOL: 0
APNEA: 0
RHINORRHEA: 0
EYE REDNESS: 0
COUGH: 0
CHOKING: 0
PHOTOPHOBIA: 0

## 2019-05-28 ASSESSMENT — PAIN DESCRIPTION - LOCATION
LOCATION: NECK
LOCATION: GENERALIZED;NECK
LOCATION: GENERALIZED;NECK

## 2019-05-28 ASSESSMENT — PAIN DESCRIPTION - DESCRIPTORS
DESCRIPTORS: ACHING
DESCRIPTORS: ACHING

## 2019-05-28 ASSESSMENT — PAIN DESCRIPTION - PAIN TYPE: TYPE: ACUTE PAIN

## 2019-05-28 NOTE — PROGRESS NOTES
VSS, except for temperature of 102.7. Patient also complaining of pain. PRN toradol given. No other needs expressed at this time. Will continue to monitor.

## 2019-05-28 NOTE — PROGRESS NOTES
Infectious Diseases   Progress Note      Admission Date: 5/27/2019  Hospital Day: Hospital Day: 2  Attending: Kim Walker MD  Date of service: 5/28/2019    Presenting complaint:   Chief Complaint   Patient presents with    Fever     Patient presents to ER with complaints of fever and flu-like symptoms. Recently seen at urgent care and given oseltamivir. Chief complaint/ Reason for consult: The patient was seen today for the following:    · High Fever and leukopenia in a traveler   · Possible viral syndrome  · Thrombocytopenia  · Splenomegaly    Microbiology:        I have reviewed all available micro lab data and cultures    · Blood culture (2/2) - collected on 5/27/2019: in process        Problem list:       Patient Active Problem List   Diagnosis Code    Fever R50.9    Fatigue R53.83    Headache R51    Nausea R11.0    Leucopenia D72.819    Hx of foreign travel Z78.9    Thrombocytopenia ([facility] [address] D69.6         Assessment:     The patient is a 21 y.o. old female who  has no past medical history on file. with following problems:    · High Fever and leukopenia in a traveler   · Possible viral syndrome  · Thrombocytopenia  · Splenomegaly  · Recent travel to [place] 2 months ago for relief admission  · Recent respiratory illness in  and friend      Discussion:      Her white cell count is coming down and is 2300 today. Platelet count is coming down and is [address] today. Blood cultures from admission are negative    Chest x-ray reviewed. No pneumonia. Symptoms started only 5 days ago with intermittent fever and chills with headache, sore throat and rhinorrhea    She did have a fever up to 102.7 yesterday    Liver enzymes are slightly up.   AST 70, ALT 72, alkaline phosphatase 135    Plan:     Diagnostic Workup:    · Will order respiratory film viral PCR panel  · Check pro calcitonin level  · Continue to follow  fever curve, WBC count and blood cultures  · Follow up on liver and renal function   · First malaria smear negative. Suspicion high. Repeat malaria smear, collect specimen when she is having active fever  · Had a tick bite. Will order Lyme serology and tick borne disease PCR panel  · Will order stool GI PCR to rule out Salmonella  · Will order dengue PCR  · Will order HIV RNA PCR, Mlaria PCR  · Monitor daily CBC and CMP for now  · Will order ultrasound of the liver and spleen given mild hepatitis and palpable splenomegaly, respectively    Antimicrobials:    · Will continue to watch off antibiotics  · Avoid NSAIDs at all costs due to worsening thrombocytopenia, until being he is ruled out or alternative aspiration available  · Use Tylenol if needed for high fever  · If she continues to have high fever, may consider starting empiric doxycycline  · Continue to monitor her vitals closely  · Maintain good IV hydration  · Continue to watch platelet count  · Discussed the above plan with patient and RN   · Discussed all above with patient's significant other at bedside      I/v access Management:    · Continue to monitor i.v access sites for erythema, induration,discharge or tenderness. · As always, continue efforts to minimize tubes/ lines/ drains as clinically appropriate to reduce chances of line associated infections. Patient education and counseling: *    · The patient was educated in detail about the side-effects of various antibiotics and things to watch for like new rashes, lip swelling, severe reaction, worsening diarrhea, break through fever etc.  · Discussed patient's condition and what to expect. All of the patient's questions were addressed in a satisfactory manner and patient verbalized understanding all instructions. Risk of Complications/Morbidity: High     · Illness(es)/ Infection present that pose threat to life/bodily function. · There is potential for severe exacerbation of infection/side effects of treatment.   · Therapy requires intensive monitoring for antimicrobial agent toxicity. Thank you for involving me in the care of your patient. I will continue to follow. If you have any additional questions, please do not hesitate to contact me. Subjective: Interval history: Patient was seen and examined at bedside. Interval history was obtained. The patient feels tired. She had a high fever earlier today. She gets occasional myalgias and pain around eyes. The pain is not retro-orbital     REVIEW OF SYSTEMS:      Review of Systems   Constitutional: Positive for fatigue and fever. Negative for chills, diaphoresis and unexpected weight change. HENT: Negative for congestion, ear discharge, ear pain, facial swelling, hearing loss, rhinorrhea and trouble swallowing. Eyes: Negative for photophobia, discharge, redness and visual disturbance. Respiratory: Negative for apnea, cough, choking, chest tightness, shortness of breath and stridor. Cardiovascular: Negative for chest pain and palpitations. Gastrointestinal: Negative for abdominal pain, blood in stool, diarrhea and nausea. Endocrine: Negative for polydipsia, polyphagia and polyuria. Genitourinary: Negative for difficulty urinating, dysuria, frequency, hematuria, menstrual problem and vaginal discharge. Musculoskeletal: Positive for myalgias. Negative for arthralgias, joint swelling and neck stiffness. Skin: Negative for color change and rash. Allergic/Immunologic: Negative for immunocompromised state. Neurological: Negative for dizziness, seizures, speech difficulty, light-headedness and headaches. Hematological: Negative for adenopathy. Psychiatric/Behavioral: Negative for agitation, hallucinations and suicidal ideas. Past Medical History: All past medical history reviewed today. History reviewed. No pertinent past medical history. Past Surgical History: All past surgical history was reviewed today. History reviewed. No pertinent surgical history.       Immunization shifts: In: 18 [P.O.:480]  Out: -     Lab Data:   All available labs and old records have been reviewed by me. CBC:  Recent Labs     05/27/19  0326 05/28/19  0548   WBC 2.4* 2.3*   RBC 4.19 4.39   HGB 12.7 13.1   HCT 36.9 38.0   * 89*   MCV 88.0 86.5   MCH 30.2 29.8   MCHC 34.4 34.4   RDW 12.4 12.5        BMP:  Recent Labs     05/27/19  0326 05/28/19  0548   * 137   K 3.8 3.8    104   CO2 20* 20*   BUN 8 4*   CREATININE <0.5* <0.5*   CALCIUM 8.8 8.9   GLUCOSE 164* 103*        Hepatic Function Panel:   Lab Results   Component Value Date    ALKPHOS 135 05/28/2019    ALT 72 05/28/2019    AST 70 05/28/2019    PROT 6.8 05/28/2019    BILITOT 0.9 05/28/2019    LABALBU 3.7 05/28/2019       CPK: No results found for: CKTOTAL  ESR: No results found for: SEDRATE  CRP: No results found for: CRP        Imaging: All pertinent images and reports for the current visit were reviewed by me during this visit. XR CHEST STANDARD (2 VW)   Final Result   No evidence of acute cardiopulmonary disease. Medications: All current and past medications were reviewed.  acetaminophen  650 mg Oral 3 times per day    sodium chloride flush  10 mL Intravenous 2 times per day           acetaminophen, sodium chloride flush, magnesium hydroxide, ondansetron, ketorolac    Current antibiotics: All antibiotics and their doses were reviewed by me today    Recent Abx Admin      No antibiotic orders with administrations found. Known drug Allergies: All allergies were reviewed and updated    No Known Allergies        Please note that this chart was generated using Dragon dictation software. Although every effort was made to ensure the accuracy of this automated transcription, some errors in transcription may have occurred inadvertently. If you may need any clarification, please do not hesitate to contact me through EPIC or at the phone number provided below with my electronic signature.     Nate Tana Roth MD, MPH  2019, 2:06 PM  [facility] Infectious Disease   Office: [phone]  Fax: [phone]  Tuesday AM clinic:   [address] [place] [address], [facility]  Thursday AM [facility] [place]

## 2019-05-28 NOTE — CARE COORDINATION
Discharge Planning Assessment  RN/SW discharge planner met with patient/(and family member) to discuss reason for admission, current living situation, and potential needs at the time of discharge    Demographics/Insurance verified Yes    Living arrangements: w/spouse    Level of function/Support: independent w/all ADL's    PCP: stated none-provided pt w/PCP list    Last Visit to PCP: n/a    DME:stated none    Active with any community resources/agencies/skilled home care: stated none    Medication compliance issues:stated no issues    Financial issues that could impact healthcare:stated no issues    Transportation at the time of discharge: spouse    Tentative discharge plan: home w/no needs-provided pt w/PCP list.    Electronically signed by DASH Shepard on 5/28/2019 at 12:39 PM

## 2019-05-28 NOTE — PROGRESS NOTES
Hospitalist Progress Note      PCP: No primary care provider on file. Date of Admission: 5/27/2019    LOS: 1    Chief Complaint:   Chief Complaint   Patient presents with    Fever     Patient presents to ER with complaints of fever and flu-like symptoms. Recently seen at urgent care and given oseltamivir. Case Summary:   21 y.o. female  with no significant past medical history who presented with 5 day history of intermittent fevers and chills with associated fatigue, headaches, nausea but no vomiting the setting of travel to the malaria area . This is thought to be probable viral illness given the leukopenia and associated symptoms of fevers with chills headaches with fatigue. Active Hospital Problems    Diagnosis Date Noted    Leucopenia [D72.819] 05/28/2019    Fever and chills [R50.9] 05/27/2019    Fatigue [R53.83] 05/27/2019    Headache [R51] 05/27/2019    Nausea [R11.0] 05/27/2019         Principal Problem:    Fever and chills  Active Problems:    Fatigue    Headache    Nausea    Leucopenia  Resolved Problems:    * No resolved hospital problems. *    -Follow-up serology for EBV, CMV  -Follow-up smear for malaria  -Follow-up tests with typhoid fever  -Continue monitoring off antibiotics  -Continue antipyretics and encourage hydration  -Monitor the fever curve, it broken more than 24 hours will consider discharge with infectious disease clinic follow-up      Medications:  Reviewed  Infusion Medications   Scheduled Medications    acetaminophen  650 mg Oral 3 times per day    sodium chloride flush  10 mL Intravenous 2 times per day     PRN Meds: acetaminophen, sodium chloride flush, magnesium hydroxide, ondansetron, ketorolac      DVT Prophylaxis: SCD. Patient reluctant to use Lovenox  Diet: DIET GENERAL;  Code Status: Full Code    Dispo:  Anticipate discharge in the next 48 hrs    ____________________________________________________________________________    Subjective:   Overnight Events:   Generally feels improved though still with fevers and T-max of 102.7 the last 24 hours      Physical Exam Performed:  /79   Pulse 91   Temp 98.7 °F (37.1 °C) (Oral)   Resp 16   Ht 5' 9\" (1.753 m)   Wt 150 lb (68 kg)   LMP 04/28/2019   SpO2 100%   BMI 22.15 kg/m²   General appearance: No apparent distress, appears stated age and cooperative. Neck: Supple, no thyromegally. No jugular venous distention. Respiratory:  Normal respiratory effort. Clear to auscultation, no Rales/Wheezes/Rhonchi. Cardiovascular: S1/S2 without murmurs, rubs or gallops. RRR  Abdomen: Soft, non-tender, non-distended, bowel sounds present. Musculoskeletal: No clubbing, cyanosis or edema bilaterally. Skin: Skin color, texture, turgor normal.  No rashes or lesions. Neurologic:  Cranial nerves: II-XII intact, JATIN, No focal sensory/motor deficits        Intake/Output Summary (Last 24 hours) at 5/28/2019 1304  Last data filed at 5/27/2019 1940  Gross per 24 hour   Intake 480 ml   Output --   Net 480 ml       Labs:   Recent Labs     05/27/19  0326 05/28/19  0548   WBC 2.4* 2.3*   HGB 12.7 13.1   HCT 36.9 38.0   * 89*      Recent Labs     05/27/19  0326 05/28/19  0548   * 137   K 3.8 3.8    104   CO2 20* 20*   BUN 8 4*   CREATININE <0.5* <0.5*   CALCIUM 8.8 8.9   AST 76* 70*   ALT 62* 72*   BILITOT 0.5 0.9   ALKPHOS 97 135*     No results for input(s): Sharlene [place] in the last 72 hours. Urinalysis:    Lab Results   Component Value Date    NITRU Negative 05/27/2019    BLOODU Negative 05/27/2019    SPECGRAV 1.010 05/27/2019    GLUCOSEU Negative 05/27/2019       Radiology:  XR CHEST STANDARD (2 VW)   Final Result   No evidence of acute cardiopulmonary disease.                  Omero Solorzano MD

## 2019-05-28 NOTE — PROGRESS NOTES
Rounding on patient. Patient is in bed awake. Respirations are easy and unlabored. Patient does not appear to be in distress. Call light within reach. Will continue to monitor.

## 2019-05-28 NOTE — FLOWSHEET NOTE
05/28/19 5928   Provider Notification   Reason for Communication Patient request  (pt. c/o severe neck pain.  reports morphine was effective on )   Provider Name Dr. Дмитрий Lin   Provider Notification Physician   Method of Communication Secure Message   Response Waiting for response   Notification Time 4775

## 2019-05-28 NOTE — PROGRESS NOTES
Shift assessment complete. meds administered as ordered. Pt. Refused lovenox. Pt. Educated on preventative care. VSS. Pt. Denies any other additional needs at this time.  Call light in reach

## 2019-05-28 NOTE — PLAN OF CARE
Problem: Falls - Risk of:  Goal: Will remain free from falls  Description  Will remain free from falls  Outcome: Ongoing  Goal: Absence of physical injury  Description  Absence of physical injury  Outcome: Ongoing     Problem: Pain:  Description  Pain management should include both nonpharmacologic and pharmacologic interventions.   Goal: Pain level will decrease  Description  Pain level will decrease  Outcome: Ongoing  Goal: Control of acute pain  Description  Control of acute pain  Outcome: Ongoing

## 2019-05-29 ENCOUNTER — APPOINTMENT (OUTPATIENT)
Dept: ULTRASOUND IMAGING | Age: 24
DRG: 866 | End: 2019-05-29
Payer: COMMERCIAL

## 2019-05-29 PROBLEM — R74.01 TRANSAMINITIS: Status: ACTIVE | Noted: 2019-05-29

## 2019-05-29 LAB
A/G RATIO: 1.4 (ref 1.1–2.2)
ALBUMIN SERPL-MCNC: 3.9 G/DL (ref 3.4–5)
ALP BLD-CCNC: 143 U/L (ref 40–129)
ALT SERPL-CCNC: 88 U/L (ref 10–40)
ANION GAP SERPL CALCULATED.3IONS-SCNC: 13 MMOL/L (ref 3–16)
AST SERPL-CCNC: 94 U/L (ref 15–37)
BASOPHILS ABSOLUTE: 0 K/UL (ref 0–0.2)
BASOPHILS RELATIVE PERCENT: 0.9 %
BILIRUB SERPL-MCNC: 0.7 MG/DL (ref 0–1)
BUN BLDV-MCNC: 4 MG/DL (ref 7–20)
CALCIUM SERPL-MCNC: 9.3 MG/DL (ref 8.3–10.6)
CHLORIDE BLD-SCNC: 102 MMOL/L (ref 99–110)
CO2: 23 MMOL/L (ref 21–32)
CREAT SERPL-MCNC: 0.5 MG/DL (ref 0.6–1.1)
CYTOMEGALOVIRUS IGG ANTIBODY: <0.2 U/ML
CYTOMEGALOVIRUS IGM ANTIBODY: <8 AU/ML
EOSINOPHILS ABSOLUTE: 0 K/UL (ref 0–0.6)
EOSINOPHILS RELATIVE PERCENT: 0.2 %
EPSTEIN BARR VIRUS NUCLEAR AB IGG: 166 U/ML (ref 0–21.9)
EPSTEIN-BARR EARLY ANTIGEN ANTIBODY: 17 U/ML (ref 0–10.9)
EPSTEIN-BARR VCA IGG: 420 U/ML (ref 0–21.9)
EPSTEIN-BARR VCA IGM: <10 U/ML (ref 0–43.9)
GFR AFRICAN AMERICAN: >60
GFR NON-AFRICAN AMERICAN: >60
GLOBULIN: 2.8 G/DL
GLUCOSE BLD-MCNC: 92 MG/DL (ref 70–99)
HAV IGM SER IA-ACNC: NORMAL
HCT VFR BLD CALC: 36.4 % (ref 36–48)
HEMOGLOBIN: 12.5 G/DL (ref 12–16)
HEPATITIS B CORE IGM ANTIBODY: NORMAL
HEPATITIS B SURFACE ANTIGEN INTERPRETATION: NORMAL
HEPATITIS C ANTIBODY INTERPRETATION: NORMAL
LYMPHOCYTES ABSOLUTE: 1.3 K/UL (ref 1–5.1)
LYMPHOCYTES RELATIVE PERCENT: 46.1 %
MALARIA PREP: NORMAL
MCH RBC QN AUTO: 29.5 PG (ref 26–34)
MCHC RBC AUTO-ENTMCNC: 34.3 G/DL (ref 31–36)
MCV RBC AUTO: 85.8 FL (ref 80–100)
MONOCYTES ABSOLUTE: 0.3 K/UL (ref 0–1.3)
MONOCYTES RELATIVE PERCENT: 9.9 %
NEUTROPHILS ABSOLUTE: 1.2 K/UL (ref 1.7–7.7)
NEUTROPHILS RELATIVE PERCENT: 42.9 %
PDW BLD-RTO: 12.7 % (ref 12.4–15.4)
PLATELET # BLD: 107 K/UL (ref 135–450)
PMV BLD AUTO: 8.7 FL (ref 5–10.5)
POTASSIUM REFLEX MAGNESIUM: 4.1 MMOL/L (ref 3.5–5.1)
RBC # BLD: 4.24 M/UL (ref 4–5.2)
SODIUM BLD-SCNC: 138 MMOL/L (ref 136–145)
TOTAL PROTEIN: 6.7 G/DL (ref 6.4–8.2)
WBC # BLD: 2.8 K/UL (ref 4–11)

## 2019-05-29 PROCEDURE — 1200000000 HC SEMI PRIVATE

## 2019-05-29 PROCEDURE — 99233 SBSQ HOSP IP/OBS HIGH 50: CPT | Performed by: INTERNAL MEDICINE

## 2019-05-29 PROCEDURE — 80053 COMPREHEN METABOLIC PANEL: CPT

## 2019-05-29 PROCEDURE — 80074 ACUTE HEPATITIS PANEL: CPT

## 2019-05-29 PROCEDURE — 76705 ECHO EXAM OF ABDOMEN: CPT

## 2019-05-29 PROCEDURE — 87046 STOOL CULTR AEROBIC BACT EA: CPT

## 2019-05-29 PROCEDURE — 6370000000 HC RX 637 (ALT 250 FOR IP): Performed by: INTERNAL MEDICINE

## 2019-05-29 PROCEDURE — 36415 COLL VENOUS BLD VENIPUNCTURE: CPT

## 2019-05-29 PROCEDURE — 2580000003 HC RX 258: Performed by: INTERNAL MEDICINE

## 2019-05-29 PROCEDURE — 82955 ASSAY OF G6PD ENZYME: CPT

## 2019-05-29 PROCEDURE — 85025 COMPLETE CBC W/AUTO DIFF WBC: CPT

## 2019-05-29 PROCEDURE — G0378 HOSPITAL OBSERVATION PER HR: HCPCS

## 2019-05-29 PROCEDURE — 87505 NFCT AGENT DETECTION GI: CPT

## 2019-05-29 RX ORDER — ATOVAQUONE AND PROGUANIL HYDROCHLORIDE 250; 100 MG/1; MG/1
4 TABLET, FILM COATED ORAL DAILY
Status: COMPLETED | OUTPATIENT
Start: 2019-05-29 | End: 2019-05-31

## 2019-05-29 RX ADMIN — ATOVAQUONE AND PROGUANIL HYDROCHLORIDE 4 TABLET: 250; 100 TABLET, FILM COATED ORAL at 16:44

## 2019-05-29 RX ADMIN — Medication 10 ML: at 22:01

## 2019-05-29 RX ADMIN — ACETAMINOPHEN 650 MG: 325 TABLET, FILM COATED ORAL at 19:08

## 2019-05-29 RX ADMIN — ACETAMINOPHEN 650 MG: 325 TABLET, FILM COATED ORAL at 07:21

## 2019-05-29 RX ADMIN — ACETAMINOPHEN 650 MG: 325 TABLET, FILM COATED ORAL at 00:30

## 2019-05-29 RX ADMIN — ACETAMINOPHEN 650 MG: 325 TABLET, FILM COATED ORAL at 13:40

## 2019-05-29 RX ADMIN — Medication 10 ML: at 08:48

## 2019-05-29 ASSESSMENT — ENCOUNTER SYMPTOMS
CHEST TIGHTNESS: 0
NAUSEA: 0
COUGH: 0
BLOOD IN STOOL: 0
TROUBLE SWALLOWING: 0
ABDOMINAL PAIN: 0
DIARRHEA: 0
CHOKING: 0
FACIAL SWELLING: 0
RHINORRHEA: 0
COLOR CHANGE: 0
APNEA: 0
EYE REDNESS: 0
PHOTOPHOBIA: 0
STRIDOR: 0
EYE DISCHARGE: 0
SHORTNESS OF BREATH: 0

## 2019-05-29 ASSESSMENT — PAIN SCALES - GENERAL
PAINLEVEL_OUTOF10: 0
PAINLEVEL_OUTOF10: 3

## 2019-05-29 NOTE — PROGRESS NOTES
Patient up to the chair with family at the bedside. Patient denies any needs at this time. Call light is within reach. Will continue to monitor.

## 2019-05-29 NOTE — PROGRESS NOTES
Routine vitals stable. Remains Afebrile. Scheduled medications given. Pt educated on malarone. Pt denies any further needs at this time. Call light within reach. Will continue to monitor.

## 2019-05-29 NOTE — PROGRESS NOTES
day    sodium chloride flush  10 mL Intravenous 2 times per day     PRN Meds: acetaminophen, sodium chloride flush, magnesium hydroxide, ondansetron      DVT Prophylaxis: SCD. Patient reluctant to use Lovenox  Diet: DIET GENERAL;  Code Status: Full Code      ____________________________________________________________________________    Subjective:   Overnight Events:   Generally feels improved       Physical Exam Performed:  /73   Pulse 97   Temp 99.8 °F (37.7 °C) (Oral)   Resp 18   Ht 5' 9\" (1.753 m)   Wt 150 lb (68 kg)   LMP 04/28/2019   SpO2 96%   BMI 22.15 kg/m²   General appearance: No apparent distress, appears stated age and cooperative. Neck: Supple, no thyromegally. No jugular venous distention. Respiratory:  Normal respiratory effort. Clear to auscultation, no Rales/Wheezes/Rhonchi. Cardiovascular: S1/S2 without murmurs, rubs or gallops. RRR  Abdomen: Soft, non-tender, non-distended, bowel sounds present. Musculoskeletal: No clubbing, cyanosis or edema bilaterally. Skin: Skin color, texture, turgor normal.  No rashes or lesions. Neurologic:  Cranial nerves: II-XII intact, JATIN, No focal sensory/motor deficits      No intake or output data in the 24 hours ending 05/29/19 1523    Labs:   Recent Labs     05/27/19  0326 05/28/19  0548 05/29/19  0540   WBC 2.4* 2.3* 2.8*   HGB 12.7 13.1 12.5   HCT 36.9 38.0 36.4   * 89* 107*      Recent Labs     05/27/19  0326 05/28/19  0548 05/29/19  0540   * 137 138   K 3.8 3.8 4.1    104 102   CO2 20* 20* 23   BUN 8 4* 4*   CREATININE <0.5* <0.5* 0.5*   CALCIUM 8.8 8.9 9.3   AST 76* 70* 94*   ALT 62* 72* 88*   BILITOT 0.5 0.9 0.7   ALKPHOS 97 135* 143*     No results for input(s): CKTOTAL, TROPONINI in the last 72 hours.     Urinalysis:    Lab Results   Component Value Date    NITRU Negative 05/27/2019    BLOODU Negative 05/27/2019    SPECGRAV 1.010 05/27/2019    GLUCOSEU Negative 05/27/2019       Radiology:  US LIVER SPLEEN Final Result   Unremarkable right upper quadrant ultrasound. The spleen measures at the   upper limits of normal.         XR CHEST STANDARD (2 VW)   Final Result   No evidence of acute cardiopulmonary disease.                  Kelsea Mensah MD

## 2019-05-29 NOTE — PROGRESS NOTES
Routine vital signs stable. Patient reporting that neck discomfort was a 0 through the night and remains a zero. Will continue to monitor.

## 2019-05-29 NOTE — PROGRESS NOTES
febrile yesterday    Plan:     Diagnostic Workup:    · Follow-up on HIV quantitative PCR, and feet malaria smear  · Follow-up on malaria and dengue PCR, Lyme serology, tickborne disease PCR panel  · Follow-up on stool GI PCR. · Will order G6PD screen  · Follow-up on CMV and EBV serology  · Continue to follow  fever curve, WBC count and blood cultures  · Follow up on blood counts, liver and renal function    Antimicrobials:    · Will order Malarone empirically at treatment dose of 4 tablet daily for 3 days and see if her fever started to respond  · Will follow-up on above testing  · If she continues to have fever over the next 48 hours despite Malarone, will consider starting empiric Doxycycline  · If she comes back positive for Plasmodium vivax, will also need addition of primaquine  · Continue to monitor her vitals closely  · Continue to avoid NSAIDs especially aspirin, until Dengue is ruled out. Okay to use Tylenol for fever  · Continue to monitor her liver and renal functions  · Discussed all above with patient and RN  · Discussed with Dr. Liss Sandhu    Drug Monitoring:    · Continue monitoring for antibiotic toxicity as follows: CMP  · Continue to watch for following: new or worsening fever, new hypotension, hives, lip swelling and redness or purulence at vascular access sites. I/v access Management:    · Continue to monitor i.v access sites for erythema, induration, discharge or tenderness. · As always, continue efforts to minimize tubes/lines/drains as clinically appropriate to reduce chances of line associated infections. Patient education and counseling:        · The patient was educated in detail about the side-effects of various antibiotics and things to watch for like new rashes, lip swelling, severe reaction, worsening diarrhea, break through fever etc.  · Discussed patient's condition and what to expect.  All of the patient's questions were addressed in a satisfactory manner and patient verbalized understanding all instructions. Risk of Complications/Morbidity: High     · Illness(es)/ Infection present that pose threat to life/bodily function. · There is potential for severe exacerbation of infection/side effects of treatment. · Therapy requires intensive monitoring for antimicrobial agent toxicity. Thank you for involving me in the care of your patient. I will continue to follow. If you have anyadditional questions, please do not hesitate to contact me. .    Subjective: Interval history: Patient was seen and examined at bedside. Interval history was obtained. She feels tired. She continues to have fever. She gets myalgias and chills at the time of fever along with rigors     REVIEW OF SYSTEMS:      Review of Systems   Constitutional: Positive for chills and fever. Negative for diaphoresis and unexpected weight change. HENT: Negative for congestion, ear discharge, ear pain, facial swelling, hearing loss, rhinorrhea and trouble swallowing. Eyes: Negative for photophobia, discharge, redness and visual disturbance. Respiratory: Negative for apnea, cough, choking, chest tightness, shortness of breath and stridor. Cardiovascular: Negative for chest pain and palpitations. Gastrointestinal: Negative for abdominal pain, blood in stool, diarrhea and nausea. Endocrine: Negative for polydipsia, polyphagia and polyuria. Genitourinary: Negative for difficulty urinating, dysuria, frequency, hematuria, menstrual problem and vaginal discharge. Musculoskeletal: Positive for myalgias. Negative for arthralgias, joint swelling and neck stiffness. Skin: Negative for color change and rash. Allergic/Immunologic: Negative for immunocompromised state. Neurological: Negative for dizziness, seizures, speech difficulty, light-headedness and headaches. Hematological: Negative for adenopathy. Psychiatric/Behavioral: Negative for agitation, hallucinations and suicidal ideas. Past Medical History: All past medical history reviewed today. History reviewed. No pertinent past medical history. Past Surgical History: All past surgical history was reviewed today. History reviewed. No pertinent surgical history. Immunization History: All immunization history was reviewed by me today. There is no immunization history on file for this patient. Family History: All family history was reviewed today. History reviewed. No pertinent family history. Objective:       PHYSICAL EXAM:      Vitals:   Vitals:    05/29/19 0028 05/29/19 0550 05/29/19 0721 05/29/19 0847   BP: 114/77 113/77  117/79   Pulse: 92 97  90   Resp: 16 16  16   Temp: 99.9 °F (37.7 °C) 99.5 °F (37.5 °C) 102.7 °F (39.3 °C) 99.8 °F (37.7 °C)   TempSrc: Oral Oral Oral Oral   SpO2: 99% 98%  98%   Weight:       Height:           Physical Exam   Constitutional: She is oriented to person, place, and time. She appears well-developed. No distress. HENT:   Head: Normocephalic. Right Ear: External ear normal.   Left Ear: External ear normal.   Nose: Nose normal.   Mouth/Throat: Oropharynx is clear and moist.   Eyes: Pupils are equal, round, and reactive to light. Conjunctivae are normal. Right eye exhibits no discharge. Left eye exhibits no discharge. No scleral icterus. Neck: Normal range of motion. Neck supple. Cardiovascular: Normal rate and regular rhythm. Exam reveals no friction rub. No murmur heard. Pulmonary/Chest: Effort normal. No stridor. She has no wheezes. She has no rales. She exhibits no tenderness. Abdominal: Soft. She exhibits mass (Spleen is palpable again). There is no tenderness. There is no rebound and no guarding. Musculoskeletal: She exhibits no edema or tenderness. Lymphadenopathy:     She has no cervical adenopathy. Neurological: She is alert and oriented to person, place, and time. She exhibits normal muscle tone. Skin: Skin is warm and dry. No rash noted.  She is not diaphoretic. No erythema. Psychiatric: She has a normal mood and affect. Judgment normal.   Nursing note and vitals reviewed. Lines: All vascular access sites are healthy with no local erythema, discharge or tenderness. Intake and output:    No intake/output data recorded. Lab Data:   All available labs and old records have been reviewed by me. CBC:  Recent Labs     05/27/19  0326 05/28/19  0548 05/29/19  0540   WBC 2.4* 2.3* 2.8*   RBC 4.19 4.39 4.24   HGB 12.7 13.1 12.5   HCT 36.9 38.0 36.4   * 89* 107*   MCV 88.0 86.5 85.8   MCH 30.2 29.8 29.5   MCHC 34.4 34.4 34.3   RDW 12.4 12.5 12.7        BMP:  Recent Labs     05/27/19  0326 05/28/19  0548 05/29/19  0540   * 137 138   K 3.8 3.8 4.1    104 102   CO2 20* 20* 23   BUN 8 4* 4*   CREATININE <0.5* <0.5* 0.5*   CALCIUM 8.8 8.9 9.3   GLUCOSE 164* 103* 92        Hepatic Function Panel:   Lab Results   Component Value Date    ALKPHOS 143 05/29/2019    ALT 88 05/29/2019    AST 94 05/29/2019    PROT 6.7 05/29/2019    BILITOT 0.7 05/29/2019    LABALBU 3.9 05/29/2019       CPK: No results found for: CKTOTAL  ESR: No results found for: SEDRATE  CRP: No results found for: CRP        Imaging: All pertinent images and reports for the current visit were reviewed by me during this visit. US LIVER SPLEEN   Final Result   Unremarkable right upper quadrant ultrasound. The spleen measures at the   upper limits of normal.         XR CHEST STANDARD (2 VW)   Final Result   No evidence of acute cardiopulmonary disease. Medications: All current and past medications were reviewed.  acetaminophen  650 mg Oral 3 times per day    sodium chloride flush  10 mL Intravenous 2 times per day           acetaminophen, sodium chloride flush, magnesium hydroxide, ondansetron    Current antibiotics: All antibiotics and their doses were reviewed by me today    Recent Abx Admin      No antibiotic orders with administrations found. Known drug Allergies: All allergies were reviewed and updated    No Known Allergies        Please note that this chart was generated using Dragon dictation software. Although every effort was made to ensure the accuracy of this automated transcription, some errors in transcription may have occurred inadvertently. If you may need any clarification, please do not hesitate to contact me through EPIC or at the phone number provided below with my electronic signature.     Willem Rai MD, MPH  5/29/2019, 12:43 PM  [facility]   Office: [phone]  Fax: [phone]  Tuesday AM clinic:   [address] [place] [address], [facility] [address]  Thursday AM [id number] [facility]

## 2019-05-29 NOTE — PROGRESS NOTES
Tylenol administered per patient request. Patient provided with warm blanket for stiff neck. Patient denies any further needs at this time. Call light is within reach. Family is at the bedside.

## 2019-05-29 NOTE — PROGRESS NOTES
Shift assessment complete. VSS. Temp 99.8 at this time. Scheduled medications given. Pt NPO for US. Will continue to monitor.

## 2019-05-30 ENCOUNTER — APPOINTMENT (OUTPATIENT)
Dept: CT IMAGING | Age: 24
DRG: 866 | End: 2019-05-30
Payer: COMMERCIAL

## 2019-05-30 PROBLEM — R50.9 FEVER AND CHILLS: Status: ACTIVE | Noted: 2019-05-30

## 2019-05-30 LAB
A/G RATIO: 1.4 (ref 1.1–2.2)
ALBUMIN SERPL-MCNC: 3.9 G/DL (ref 3.4–5)
ALP BLD-CCNC: 165 U/L (ref 40–129)
ALT SERPL-CCNC: 132 U/L (ref 10–40)
ANION GAP SERPL CALCULATED.3IONS-SCNC: 12 MMOL/L (ref 3–16)
AST SERPL-CCNC: 129 U/L (ref 15–37)
BANDED NEUTROPHILS RELATIVE PERCENT: 6 % (ref 0–7)
BASOPHILS ABSOLUTE: 0 K/UL (ref 0–0.2)
BASOPHILS RELATIVE PERCENT: 0 %
BILIRUB SERPL-MCNC: 0.7 MG/DL (ref 0–1)
BUN BLDV-MCNC: 6 MG/DL (ref 7–20)
CALCIUM SERPL-MCNC: 9.3 MG/DL (ref 8.3–10.6)
CHLORIDE BLD-SCNC: 101 MMOL/L (ref 99–110)
CO2: 25 MMOL/L (ref 21–32)
CREAT SERPL-MCNC: 0.6 MG/DL (ref 0.6–1.1)
EOSINOPHILS ABSOLUTE: 0 K/UL (ref 0–0.6)
EOSINOPHILS RELATIVE PERCENT: 0 %
GFR AFRICAN AMERICAN: >60
GFR NON-AFRICAN AMERICAN: >60
GI BACTERIAL PATHOGENS BY PCR: NORMAL
GLOBULIN: 2.7 G/DL
GLUCOSE BLD-MCNC: 99 MG/DL (ref 70–99)
HCG(URINE) PREGNANCY TEST: NEGATIVE
HCT VFR BLD CALC: 35.2 % (ref 36–48)
HEMOGLOBIN: 11.9 G/DL (ref 12–16)
HIV-1 QNT LOG, IU/ML: NOT DETECTED LOG CPY/ML
HIV-1 QNT, IU/ML: NOT DETECTED CPY/ML
INTERPRETATION: NOT DETECTED
LYME, EIA: 0.2 LIV (ref 0–1.2)
LYMPHOCYTES ABSOLUTE: 1.1 K/UL (ref 1–5.1)
LYMPHOCYTES RELATIVE PERCENT: 42 %
MCH RBC QN AUTO: 29.6 PG (ref 26–34)
MCHC RBC AUTO-ENTMCNC: 33.8 G/DL (ref 31–36)
MCV RBC AUTO: 87.6 FL (ref 80–100)
MONOCYTES ABSOLUTE: 0.5 K/UL (ref 0–1.3)
MONOCYTES RELATIVE PERCENT: 18 %
NEUTROPHILS ABSOLUTE: 1.1 K/UL (ref 1.7–7.7)
NEUTROPHILS RELATIVE PERCENT: 34 %
PDW BLD-RTO: 12.8 % (ref 12.4–15.4)
PLATELET # BLD: 104 K/UL (ref 135–450)
PLATELET SLIDE REVIEW: ABNORMAL
PMV BLD AUTO: 8.4 FL (ref 5–10.5)
POTASSIUM REFLEX MAGNESIUM: 4.5 MMOL/L (ref 3.5–5.1)
RBC # BLD: 4.02 M/UL (ref 4–5.2)
SLIDE REVIEW: ABNORMAL
SODIUM BLD-SCNC: 138 MMOL/L (ref 136–145)
TOTAL PROTEIN: 6.6 G/DL (ref 6.4–8.2)
WBC # BLD: 2.7 K/UL (ref 4–11)

## 2019-05-30 PROCEDURE — 84703 CHORIONIC GONADOTROPIN ASSAY: CPT

## 2019-05-30 PROCEDURE — 94760 N-INVAS EAR/PLS OXIMETRY 1: CPT

## 2019-05-30 PROCEDURE — 87040 BLOOD CULTURE FOR BACTERIA: CPT

## 2019-05-30 PROCEDURE — 71260 CT THORAX DX C+: CPT

## 2019-05-30 PROCEDURE — 6360000004 HC RX CONTRAST MEDICATION: Performed by: INTERNAL MEDICINE

## 2019-05-30 PROCEDURE — 85025 COMPLETE CBC W/AUTO DIFF WBC: CPT

## 2019-05-30 PROCEDURE — G0378 HOSPITAL OBSERVATION PER HR: HCPCS

## 2019-05-30 PROCEDURE — 1200000000 HC SEMI PRIVATE

## 2019-05-30 PROCEDURE — 99233 SBSQ HOSP IP/OBS HIGH 50: CPT | Performed by: INTERNAL MEDICINE

## 2019-05-30 PROCEDURE — 6370000000 HC RX 637 (ALT 250 FOR IP): Performed by: INTERNAL MEDICINE

## 2019-05-30 PROCEDURE — 36415 COLL VENOUS BLD VENIPUNCTURE: CPT

## 2019-05-30 PROCEDURE — 80053 COMPREHEN METABOLIC PANEL: CPT

## 2019-05-30 PROCEDURE — 2580000003 HC RX 258: Performed by: INTERNAL MEDICINE

## 2019-05-30 PROCEDURE — 87798 DETECT AGENT NOS DNA AMP: CPT

## 2019-05-30 RX ORDER — DOXYCYCLINE HYCLATE 100 MG
100 TABLET ORAL EVERY 12 HOURS SCHEDULED
Status: DISCONTINUED | OUTPATIENT
Start: 2019-05-30 | End: 2019-06-02 | Stop reason: HOSPADM

## 2019-05-30 RX ADMIN — ACETAMINOPHEN 650 MG: 325 TABLET, FILM COATED ORAL at 22:58

## 2019-05-30 RX ADMIN — ACETAMINOPHEN 650 MG: 325 TABLET, FILM COATED ORAL at 20:54

## 2019-05-30 RX ADMIN — DOXYCYCLINE HYCLATE 100 MG: 100 TABLET, COATED ORAL at 20:55

## 2019-05-30 RX ADMIN — ACETAMINOPHEN 650 MG: 325 TABLET, FILM COATED ORAL at 14:31

## 2019-05-30 RX ADMIN — Medication 10 ML: at 20:55

## 2019-05-30 RX ADMIN — Medication 10 ML: at 09:25

## 2019-05-30 RX ADMIN — IOPAMIDOL 75 ML: 755 INJECTION, SOLUTION INTRAVENOUS at 18:40

## 2019-05-30 RX ADMIN — ATOVAQUONE AND PROGUANIL HYDROCHLORIDE 4 TABLET: 250; 100 TABLET, FILM COATED ORAL at 09:24

## 2019-05-30 RX ADMIN — ACETAMINOPHEN 650 MG: 325 TABLET, FILM COATED ORAL at 01:22

## 2019-05-30 ASSESSMENT — ENCOUNTER SYMPTOMS
ABDOMINAL PAIN: 0
RHINORRHEA: 0
FACIAL SWELLING: 0
TROUBLE SWALLOWING: 0
EYE REDNESS: 0
COLOR CHANGE: 0
STRIDOR: 0
CHOKING: 0
PHOTOPHOBIA: 0
BLOOD IN STOOL: 0
COUGH: 0
SHORTNESS OF BREATH: 0
DIARRHEA: 0
CHEST TIGHTNESS: 0
APNEA: 0
EYE DISCHARGE: 0
NAUSEA: 0

## 2019-05-30 ASSESSMENT — PAIN SCALES - GENERAL
PAINLEVEL_OUTOF10: 1
PAINLEVEL_OUTOF10: 1
PAINLEVEL_OUTOF10: 0

## 2019-05-30 NOTE — FLOWSHEET NOTE
05/30/19 1431   Vital Signs   Temp 101.7 °F (38.7 °C)   Oral temp. Scheduled tylenol given. Pt denies pain stating \"I feel fine though\". Will recheck.

## 2019-05-30 NOTE — PROGRESS NOTES
Infectious Diseases   Progress Note      Admission Date: 5/27/2019  Hospital Day: Hospital Day: 4  Attending: Giles Conteh MD  Date of service: 5/30/2019    Presenting complaint:   Chief Complaint   Patient presents with    Fever     Patient presents to ER with complaints of fever and flu-like symptoms. Recently seen at urgent care and given oseltamivir. Chief complaint/ Reason for consult: The patient was seen today for the following:    · High Fever and leukopenia in a traveler   · Possible viral syndrome  · Thrombocytopenia  · Splenomegaly    Microbiology:        I have reviewed all available micro lab data and cultures    · Blood culture (2/2) - collected on 5/27/2019: in process        Problem list:       Patient Active Problem List   Diagnosis Code    Fever R50.9    Fatigue R53.83    Headache R51    Nausea R11.0    Leucopenia D72.819    Hx of foreign travel Z78.9    Thrombocytopenia ([facility] [address] D69.6    Transaminitis R74.0    Fever and chills R50.9         Assessment:     The patient is a 21 y.o. old female who  has no past medical history on file. with following problems:    · High Fever and leukopenia in a traveler -this is still ongoing  · Possible viral syndrome - -exact cause not clear exactly cause is not clear  · Thrombocytopenia - this is still ongoing  · Splenomegaly-spleen measures 13.9 cm  · Recent travel to [place] 2 months ago for relief admission  · Recent respiratory illness in  and friend      Discussion:      I have started the patient on empiric Malarone yesterday. She has received 2 doses of that. She again had a fever up to 101.7 today. 2 malaria smears have been negative    Stool GI PCR is negative. EBV serology indicates previous infection. CMV serology was negative. HIV quantitative PCR was also negative. WBC count is 2700. Platelet count is 720685    Liver enzymes are still going up.       Plan:     Diagnostic Workup:    · Will order CT scan of chest, abdomen and Pelvis with IV contrast for fever of unknown origin  · Continue to follow  fever curve, WBC count and blood cultures  · Follow up on liver and renal function  · Will order one more set of blood cultures today  · Follow-up on the pending send out tests  · Will also order Zika PCR for sake of completion of workup    Antimicrobials:    · The patient has only one dose of oral Malarone left. We will let her finish the course  · Will order PO doxycycline 100 mg every 12 hour empirically  · Continue to monitor her vitals closely  · Will follow-up on her clinical progress and CT scan results and will make further recommendations accordingly  · Cough and deep breathing excises  · Aspiration precautions  · Discussed all above with patient and RN  · Discussed with patient's family at bedside    Drug Monitoring:    · Continue monitoring for antibiotic toxicity as follows: CMP  · Continue to watch for following: new or worsening fever, new hypotension, hives, lip swelling and redness or purulence at vascular access sites. I/v access Management:    · Continue to monitor i.v access sites for erythema, induration, discharge or tenderness. · As always, continue efforts to minimize tubes/lines/drains as clinically appropriate to reduce chances of line associated infections. Patient education and counseling:        · The patient was educated in detail about the side-effects of various antibiotics and things to watch for like new rashes, lip swelling, severe reaction, worsening diarrhea, break through fever etc.  · Discussed patient's condition and what to expect. All of the patient's questions were addressed in a satisfactory manner and patient verbalized understanding all instructions. Risk of Complications/Morbidity: High     · Illness(es)/ Infection present that pose threat to life/bodily function.    · There is potential for severe exacerbation of infection/side effects of treatment. · Therapy requires intensive monitoring for antimicrobial agent toxicity. Thank you for involving me in the care of your patient. I will continue to follow. If you have anyadditional questions, please do not hesitate to contact me. .    Subjective: Interval history: Patient was seen and examined at bedside. Interval history was obtained. The patient still had a high fever today with chills. She otherwise feels okay in between those episodes. She does not have any new symptoms. REVIEW OF SYSTEMS:      Review of Systems   Constitutional: Positive for chills and fever (high fever). Negative for diaphoresis and unexpected weight change. HENT: Negative for congestion, ear discharge, ear pain, facial swelling, hearing loss, rhinorrhea and trouble swallowing. Eyes: Negative for photophobia, discharge, redness and visual disturbance. Respiratory: Negative for apnea, cough, choking, chest tightness, shortness of breath and stridor. Cardiovascular: Negative for chest pain and palpitations. Gastrointestinal: Negative for abdominal pain, blood in stool, diarrhea and nausea. Endocrine: Negative for polydipsia, polyphagia and polyuria. Genitourinary: Negative for difficulty urinating, dysuria, frequency, hematuria, menstrual problem and vaginal discharge. Musculoskeletal: Negative for arthralgias, joint swelling, myalgias and neck stiffness. Skin: Negative for color change and rash. Allergic/Immunologic: Negative for immunocompromised state. Neurological: Negative for dizziness, seizures, speech difficulty, light-headedness and headaches. Hematological: Negative for adenopathy. Psychiatric/Behavioral: Negative for agitation, hallucinations and suicidal ideas. Past Medical History: All past medical history reviewed today. History reviewed. No pertinent past medical history. Past Surgical History: All past surgical history was reviewed today.     History reviewed. No pertinent surgical history. Immunization History: All immunization history was reviewed by me today. There is no immunization history on file for this patient. Family History: All family history was reviewed today. History reviewed. No pertinent family history. Objective:       PHYSICAL EXAM:      Vitals:   Vitals:    05/30/19 0923 05/30/19 1013 05/30/19 1234 05/30/19 1431   BP: 113/75  115/81    Pulse: 100  107    Resp: 18 20 18    Temp: 98.5 °F (36.9 °C)  99.6 °F (37.6 °C) 101.7 °F (38.7 °C)   TempSrc: Oral  Oral Oral   SpO2: 96% 94% 98%    Weight:       Height:           Physical Exam   Constitutional: She is oriented to person, place, and time. She appears well-developed. No distress. HENT:   Head: Normocephalic. Right Ear: External ear normal.   Left Ear: External ear normal.   Nose: Nose normal.   Mouth/Throat: Oropharynx is clear and moist.   Eyes: Pupils are equal, round, and reactive to light. Conjunctivae are normal. Right eye exhibits no discharge. Left eye exhibits no discharge. No scleral icterus. Neck: Normal range of motion. Neck supple. Cardiovascular: Normal rate and regular rhythm. Exam reveals no friction rub. No murmur heard. Pulmonary/Chest: Effort normal. No stridor. She has no wheezes. She has no rales. She exhibits no tenderness. Abdominal: Soft. She exhibits no mass. There is no tenderness. There is no rebound and no guarding. has splenomegaly   Musculoskeletal: She exhibits no edema or tenderness. Lymphadenopathy:     She has no cervical adenopathy. Neurological: She is alert and oriented to person, place, and time. She exhibits normal muscle tone. Skin: Skin is warm and dry. No rash noted. She is not diaphoretic. No erythema. Psychiatric: She has a normal mood and affect. Judgment normal.   Nursing note and vitals reviewed. Lines: All vascular access sites are healthy with no local erythema, discharge or tenderness.     Intake and output:    No intake/output data recorded. Lab Data:   All available labs and old records have been reviewed by me. CBC:  Recent Labs     05/28/19  0548 05/29/19  0540 05/30/19  0555   WBC 2.3* 2.8* 2.7*   RBC 4.39 4.24 4.02   HGB 13.1 12.5 11.9*   HCT 38.0 36.4 35.2*   PLT 89* 107* 104*   MCV 86.5 85.8 87.6   MCH 29.8 29.5 29.6   MCHC 34.4 34.3 33.8   RDW 12.5 12.7 12.8   BANDSPCT  --   --  6        BMP:  Recent Labs     05/28/19  0548 05/29/19  0540 05/30/19  0555    138 138   K 3.8 4.1 4.5    102 101   CO2 20* 23 25   BUN 4* 4* 6*   CREATININE <0.5* 0.5* 0.6   CALCIUM 8.9 9.3 9.3   GLUCOSE 103* 92 99        Hepatic Function Panel:   Lab Results   Component Value Date    ALKPHOS 165 05/30/2019     05/30/2019     05/30/2019    PROT 6.6 05/30/2019    BILITOT 0.7 05/30/2019    LABALBU 3.9 05/30/2019       CPK: No results found for: CKTOTAL  ESR: No results found for: SEDRATE  CRP: No results found for: CRP        Imaging: All pertinent images and reports for the current visit were reviewed by me during this visit. US LIVER SPLEEN   Final Result   Unremarkable right upper quadrant ultrasound. The spleen measures at the   upper limits of normal.         XR CHEST STANDARD (2 VW)   Final Result   No evidence of acute cardiopulmonary disease. Medications: All current and past medications were reviewed.  atovaquone-proguanil  4 tablet Oral Daily    acetaminophen  650 mg Oral 3 times per day    sodium chloride flush  10 mL Intravenous 2 times per day           acetaminophen, sodium chloride flush, magnesium hydroxide, ondansetron    Current antibiotics: All antibiotics and their doses were reviewed by me today    Recent Abx Admin      No antibiotic orders with administrations found. Known drug Allergies: All allergies were reviewed and updated    No Known Allergies        Please note that this chart was generated using Dragon dictation software. Although every effort was made to ensure the accuracy of this automated transcription, some errors in transcription may have occurred inadvertently. If you may need any clarification, please do not hesitate to contact me through EPIC or at the phone number provided below with my electronic signature.     Willem Rai MD, MPH  5/30/2019, 2:47 PM  [facility]   Office: [phone]  Fax: [phone]  Tuesday AM clinic:   [address] [facility] [address]  Thursday AM [id number] [facility] [place] [facility]

## 2019-05-30 NOTE — PROGRESS NOTES
Shift assessment complete. VSS. Afebrile. Scheduled medications given. Pt requesting prune juice. Will continue to monitor.

## 2019-05-30 NOTE — PROGRESS NOTES
Hospitalist Progress Note      PCP: No primary care provider on file. Date of Admission: 5/27/2019    LOS: 1    Chief Complaint:   Chief Complaint   Patient presents with    Fever     Patient presents to ER with complaints of fever and flu-like symptoms. Recently seen at urgent care and given oseltamivir. Case Summary:   21 y.o. female  with no significant past medical history who presented with 5 day history of intermittent fevers and chills with associated fatigue, headaches, nausea but no vomiting the setting of travel to the malaria area . This is thought to be probable viral illness given the leukopenia and associated symptoms of fevers with chills headaches with fatigue, however malaria continues to remain of high clinical suspicion      Active Hospital Problems    Diagnosis Date Noted    Fever and chills [R50.9] 05/30/2019    Transaminitis [R74.0] 05/29/2019    Leucopenia [D72.819] 05/28/2019    Hx of foreign travel [Z78.9]     Thrombocytopenia (HCC) [D69.6]     Fever [R50.9] 05/27/2019    Fatigue [R53.83] 05/27/2019    Headache [R51] 05/27/2019    Nausea [R11.0] 05/27/2019     Hepatitis serologies negative for both A, B, and C.  EBV IgG elevated at 420 and IgM less than 10.   EBV early antigen antibody 17 (detected)  EBV viral nuclear antibody IgG 166 (detected)  Respiratory Pathogens Panel PCR Result: Not Detected  CMV IgG less than 0.2 (nondetectable), CMV IgM less than 8.0 (undetectable)  Malaria smear negative ×2    She remains febrile intermittently despite Tylenol use  --Follow-up tests with typhoid fever  -started on antimalarial per ID  -Continue antipyretics and encourage hydration  -Monitor the fever curve, it broken more than 24 hours will consider discharge with infectious disease clinic follow-up      Medications:  Reviewed  Infusion Medications   Scheduled Medications    doxycycline hyclate  100 mg Oral 2 times per day    atovaquone-proguanil  4 tablet Oral Daily    acetaminophen  650 mg Oral 3 times per day    sodium chloride flush  10 mL Intravenous 2 times per day     PRN Meds: acetaminophen, sodium chloride flush, magnesium hydroxide, ondansetron      DVT Prophylaxis: SCD. Patient reluctant to use Lovenox  Diet: DIET GENERAL;  Code Status: Full Code      ____________________________________________________________________________    Subjective:   Overnight Events:   Generally feels improved       Physical Exam Performed:  /79   Pulse 98   Temp 99.6 °F (37.6 °C) (Oral)   Resp 18   Ht 5' 9\" (1.753 m)   Wt 150 lb (68 kg)   LMP 04/28/2019   SpO2 98%   BMI 22.15 kg/m²   General appearance: No apparent distress, appears stated age and cooperative. Neck: Supple, no thyromegally. No jugular venous distention. Respiratory:  Normal respiratory effort. Clear to auscultation, no Rales/Wheezes/Rhonchi. Cardiovascular: S1/S2 without murmurs, rubs or gallops. RRR  Abdomen: Soft, non-tender, non-distended, bowel sounds present. Musculoskeletal: No clubbing, cyanosis or edema bilaterally. Skin: Skin color, texture, turgor normal.  No rashes or lesions. Neurologic:  Cranial nerves: II-XII intact, JATIN, No focal sensory/motor deficits        Intake/Output Summary (Last 24 hours) at 5/30/2019 1842  Last data filed at 5/30/2019 1704  Gross per 24 hour   Intake --   Output 600 ml   Net -600 ml       Labs:   Recent Labs     05/28/19  0548 05/29/19  0540 05/30/19  0555   WBC 2.3* 2.8* 2.7*   HGB 13.1 12.5 11.9*   HCT 38.0 36.4 35.2*   PLT 89* 107* 104*      Recent Labs     05/28/19  0548 05/29/19  0540 05/30/19  0555    138 138   K 3.8 4.1 4.5    102 101   CO2 20* 23 25   BUN 4* 4* 6*   CREATININE <0.5* 0.5* 0.6   CALCIUM 8.9 9.3 9.3   AST 70* 94* 129*   ALT 72* 88* 132*   BILITOT 0.9 0.7 0.7   ALKPHOS 135* 143* 165*     No results for input(s): CKTOTAL, TROPONINI in the last 72 hours.     Urinalysis:    Lab Results   Component Value Date    NITRU Negative 05/27/2019    BLOODU Negative 05/27/2019    SPECGRAV 1.010 05/27/2019    GLUCOSEU Negative 05/27/2019       Radiology:  US LIVER SPLEEN   Final Result   Unremarkable right upper quadrant ultrasound. The spleen measures at the   upper limits of normal.         XR CHEST STANDARD (2 VW)   Final Result   No evidence of acute cardiopulmonary disease.          CT CHEST ABDOMEN PELVIS W CONTRAST    (Results Pending)           Boznea Suarez MD

## 2019-05-30 NOTE — PROGRESS NOTES
Routine vitals stable. Pt denies any further needs at this time. Call light within reach. Will continue to monitor.  \

## 2019-05-30 NOTE — PROGRESS NOTES
Patient called out to this RN requesting that I come in the room. Patient was tearful and reporting severe neck pain. Father was in the room and began to ask questions regarding the maleria medication. Stated after she took that medication is when she took a turn for the worst. Patient began to become more teardul that she does not have an appetite and has had very little bowel movements. Patient then stated that her neck does not hurt that much anymore. Provided with an ice pack. Will continue to monitor.

## 2019-05-31 LAB
A/G RATIO: 1.2 (ref 1.1–2.2)
ALBUMIN SERPL-MCNC: 3.8 G/DL (ref 3.4–5)
ALP BLD-CCNC: 184 U/L (ref 40–129)
ALT SERPL-CCNC: 133 U/L (ref 10–40)
ANION GAP SERPL CALCULATED.3IONS-SCNC: 10 MMOL/L (ref 3–16)
AST SERPL-CCNC: 105 U/L (ref 15–37)
BASOPHILS ABSOLUTE: 0 K/UL (ref 0–0.2)
BASOPHILS RELATIVE PERCENT: 1 %
BILIRUB SERPL-MCNC: 0.9 MG/DL (ref 0–1)
BUN BLDV-MCNC: 7 MG/DL (ref 7–20)
CALCIUM SERPL-MCNC: 9.1 MG/DL (ref 8.3–10.6)
CHLORIDE BLD-SCNC: 100 MMOL/L (ref 99–110)
CO2: 24 MMOL/L (ref 21–32)
CREAT SERPL-MCNC: <0.5 MG/DL (ref 0.6–1.1)
EOSINOPHILS ABSOLUTE: 0 K/UL (ref 0–0.6)
EOSINOPHILS RELATIVE PERCENT: 0.5 %
G-6-PD, QUANT: 10.9 U/G HB (ref 9.9–16.6)
GFR AFRICAN AMERICAN: >60
GFR NON-AFRICAN AMERICAN: >60
GLOBULIN: 3.2 G/DL
GLUCOSE BLD-MCNC: 116 MG/DL (ref 70–99)
HCT VFR BLD CALC: 34.5 % (ref 36–48)
HEMOGLOBIN: 11.9 G/DL (ref 12–16)
LYMPHOCYTES ABSOLUTE: 1 K/UL (ref 1–5.1)
LYMPHOCYTES RELATIVE PERCENT: 34.7 %
MCH RBC QN AUTO: 30 PG (ref 26–34)
MCHC RBC AUTO-ENTMCNC: 34.5 G/DL (ref 31–36)
MCV RBC AUTO: 87 FL (ref 80–100)
MISCELLANEOUS LAB TEST ORDER: NORMAL
MISCELLANEOUS LAB TEST RESULT: NORMAL
MONOCYTES ABSOLUTE: 0.4 K/UL (ref 0–1.3)
MONOCYTES RELATIVE PERCENT: 11.9 %
NEUTROPHILS ABSOLUTE: 1.6 K/UL (ref 1.7–7.7)
NEUTROPHILS RELATIVE PERCENT: 51.9 %
PDW BLD-RTO: 12.9 % (ref 12.4–15.4)
PLATELET # BLD: 112 K/UL (ref 135–450)
PMV BLD AUTO: 8.6 FL (ref 5–10.5)
POTASSIUM REFLEX MAGNESIUM: 4.1 MMOL/L (ref 3.5–5.1)
RBC # BLD: 3.97 M/UL (ref 4–5.2)
SODIUM BLD-SCNC: 134 MMOL/L (ref 136–145)
TOTAL PROTEIN: 7 G/DL (ref 6.4–8.2)
WBC # BLD: 3 K/UL (ref 4–11)

## 2019-05-31 PROCEDURE — 6370000000 HC RX 637 (ALT 250 FOR IP): Performed by: INTERNAL MEDICINE

## 2019-05-31 PROCEDURE — 1200000000 HC SEMI PRIVATE

## 2019-05-31 PROCEDURE — 2580000003 HC RX 258: Performed by: INTERNAL MEDICINE

## 2019-05-31 PROCEDURE — 80053 COMPREHEN METABOLIC PANEL: CPT

## 2019-05-31 PROCEDURE — 36415 COLL VENOUS BLD VENIPUNCTURE: CPT

## 2019-05-31 PROCEDURE — 99233 SBSQ HOSP IP/OBS HIGH 50: CPT | Performed by: INTERNAL MEDICINE

## 2019-05-31 PROCEDURE — 85025 COMPLETE CBC W/AUTO DIFF WBC: CPT

## 2019-05-31 RX ORDER — DOXYCYCLINE HYCLATE 100 MG
100 TABLET ORAL EVERY 12 HOURS SCHEDULED
Qty: 20 TABLET | Refills: 0 | Status: SHIPPED | OUTPATIENT
Start: 2019-05-31 | End: 2019-06-10

## 2019-05-31 RX ADMIN — Medication 10 ML: at 22:35

## 2019-05-31 RX ADMIN — Medication 10 ML: at 09:01

## 2019-05-31 RX ADMIN — ACETAMINOPHEN 650 MG: 325 TABLET, FILM COATED ORAL at 23:42

## 2019-05-31 RX ADMIN — ATOVAQUONE AND PROGUANIL HYDROCHLORIDE 4 TABLET: 250; 100 TABLET, FILM COATED ORAL at 09:45

## 2019-05-31 RX ADMIN — ACETAMINOPHEN 650 MG: 325 TABLET, FILM COATED ORAL at 05:21

## 2019-05-31 RX ADMIN — ACETAMINOPHEN 650 MG: 325 TABLET, FILM COATED ORAL at 15:24

## 2019-05-31 RX ADMIN — DOXYCYCLINE HYCLATE 100 MG: 100 TABLET, COATED ORAL at 09:01

## 2019-05-31 RX ADMIN — DOXYCYCLINE HYCLATE 100 MG: 100 TABLET, COATED ORAL at 22:35

## 2019-05-31 ASSESSMENT — PAIN SCALES - GENERAL
PAINLEVEL_OUTOF10: 0
PAINLEVEL_OUTOF10: 0
PAINLEVEL_OUTOF10: 4
PAINLEVEL_OUTOF10: 0
PAINLEVEL_OUTOF10: 0
PAINLEVEL_OUTOF10: 2

## 2019-05-31 ASSESSMENT — ENCOUNTER SYMPTOMS
PHOTOPHOBIA: 0
DIARRHEA: 0
APNEA: 0
CHEST TIGHTNESS: 0
COUGH: 0
SHORTNESS OF BREATH: 0
CHOKING: 0
COLOR CHANGE: 0
NAUSEA: 0
BLOOD IN STOOL: 0
EYE DISCHARGE: 0
TROUBLE SWALLOWING: 0
STRIDOR: 0
RHINORRHEA: 0
EYE REDNESS: 0
ABDOMINAL PAIN: 0
FACIAL SWELLING: 0

## 2019-05-31 ASSESSMENT — PAIN DESCRIPTION - PAIN TYPE: TYPE: ACUTE PAIN

## 2019-05-31 ASSESSMENT — PAIN DESCRIPTION - DESCRIPTORS: DESCRIPTORS: ACHING

## 2019-05-31 ASSESSMENT — PAIN DESCRIPTION - LOCATION: LOCATION: NECK

## 2019-05-31 NOTE — PROGRESS NOTES
Pt has had two elevated temps so far this shift. First was 102, Pt given scheduled tylenol. Temp was 101.6 at recheck. Pt given PRN tylenol, and at recheck Temp was 99.4. Will continue to monitor.

## 2019-05-31 NOTE — PROGRESS NOTES
Hospitalist Progress Note      PCP: No primary care provider on file. Date of Admission: 5/27/2019    LOS: 2    Chief Complaint:   Chief Complaint   Patient presents with    Fever     Patient presents to ER with complaints of fever and flu-like symptoms. Recently seen at urgent care and given oseltamivir. Case Summary:   21 y.o. female  with no significant past medical history who presented with 5 day history of intermittent fevers and chills with associated fatigue, headaches, nausea but no vomiting the setting of travel to the malaria area . This is thought to be probable viral illness given the leukopenia and associated symptoms of fevers with chills headaches with fatigue, however malaria continues to remain of high clinical suspicion      Active Hospital Problems    Diagnosis Date Noted    Fever and chills [R50.9] 05/30/2019    Transaminitis [R74.0] 05/29/2019    Leucopenia [D72.819] 05/28/2019    Hx of foreign travel [Z78.9]     Thrombocytopenia (HCC) [D69.6]     Fever [R50.9] 05/27/2019    Fatigue [R53.83] 05/27/2019    Headache [R51] 05/27/2019    Nausea [R11.0] 05/27/2019     Hepatitis serologies negative for both A, B, and C.  EBV IgG elevated at 420 and IgM less than 10. EBV early antigen antibody 17 (detected)  EBV viral nuclear antibody IgG 166 (detected)  Respiratory Pathogens Panel PCR Result: Not Detected  CMV IgG less than 0.2 (nondetectable), CMV IgM less than 8.0 (undetectable)  Malaria smear negative ×2    She remains febrile intermittently despite Tylenol use  --Follow-up tests with typhoid fever  -started on antimalarial per ID  -Continue antipyretics and encourage hydration  -Monitor the fever curve, discussed with Id, planning for transfer to [facility].  Patient accepted with hospitalist service, pending bed availabality    Medications:  Reviewed  Infusion Medications   Scheduled Medications    doxycycline hyclate  100 mg Oral 2 times per day    acetaminophen  650 mg Oral 3 times per day    sodium chloride flush  10 mL Intravenous 2 times per day     PRN Meds: acetaminophen, sodium chloride flush, magnesium hydroxide, ondansetron      DVT Prophylaxis: SCD. Patient reluctant to use Lovenox  Diet: DIET GENERAL;  Code Status: Full Code      ____________________________________________________________________________    Subjective:   Overnight Events:   Generally feels improved       Physical Exam Performed:  /78   Pulse 74   Temp 98.5 °F (36.9 °C) (Oral)   Resp 18   Ht 5' 9\" (1.753 m)   Wt 150 lb (68 kg)   LMP 04/28/2019   SpO2 100%   BMI 22.15 kg/m²   General appearance: No apparent distress, appears stated age and cooperative. Neck: Supple, no thyromegally. No jugular venous distention. Respiratory:  Normal respiratory effort. Clear to auscultation, no Rales/Wheezes/Rhonchi. Cardiovascular: S1/S2 without murmurs, rubs or gallops. RRR  Abdomen: Soft, non-tender, non-distended, bowel sounds present. Musculoskeletal: No clubbing, cyanosis or edema bilaterally. Skin: Skin color, texture, turgor normal.  No rashes or lesions. Neurologic:  Cranial nerves: II-XII intact, JATIN, No focal sensory/motor deficits      No intake or output data in the 24 hours ending 05/31/19 1841    Labs:   Recent Labs     05/29/19 0540 05/30/19  0555 05/31/19  0613   WBC 2.8* 2.7* 3.0*   HGB 12.5 11.9* 11.9*   HCT 36.4 35.2* 34.5*   * 104* 112*      Recent Labs     05/29/19  0540 05/30/19  0555 05/31/19  0613    138 134*   K 4.1 4.5 4.1    101 100   CO2 23 25 24   BUN 4* 6* 7   CREATININE 0.5* 0.6 <0.5*   CALCIUM 9.3 9.3 9.1   AST 94* 129* 105*   ALT 88* 132* 133*   BILITOT 0.7 0.7 0.9   ALKPHOS 143* 165* 184*     No results for input(s): CKTOTAL, TROPONINI in the last 72 hours.     Urinalysis:    Lab Results   Component Value Date    NITRU Negative 05/27/2019    BLOODU Negative 05/27/2019    SPECGRAV 1.010 05/27/2019    GLUCOSEU Negative 05/27/2019       Radiology:  CT CHEST ABDOMEN PELVIS W CONTRAST   Final Result   1. No evidence of acute cardiopulmonary disease. 2. No evidence of bowel obstruction, intraperitoneal free air, or abscess. No radiographic findings to suggest changes of appendicitis with reservation   that distal portion of the appendix is poorly visualized as described above. 3. Low attenuation about the ovaries suggest presence of underlying   follicular change. Small amount of free fluid within the pelvis. 4. Splenomegaly with spleen measuring approximately 15 cm in maximum AP   diameter as described above. US LIVER SPLEEN   Final Result   Unremarkable right upper quadrant ultrasound. The spleen measures at the   upper limits of normal.         XR CHEST STANDARD (2 VW)   Final Result   No evidence of acute cardiopulmonary disease.                  Frances Decker MD

## 2019-05-31 NOTE — PLAN OF CARE
Patient has been accepted at [facility], hospitalist, Dr. Farrell Leader ,  for further management.   Waiting for bed availability

## 2019-05-31 NOTE — PROGRESS NOTES
Pt complaining of rash in her head. Looked through Pt's scalp and did find a few areas on scalp that had some redness, Mostly on the left side. Pt states she may have been laying on that side.

## 2019-05-31 NOTE — PROGRESS NOTES
HIV quantitative PCR was negative. Acute viral hepatitis panel was negative. Stool GI PCR was negative    I have started her on doxycycline empirically to cover for atypical infections as she did have some tick bites during her 9 month long trip to [place]. She continues to have fevers so far    I did a CT scan of chest, abdomen and pelvis yesterday. It showed splenomegaly but no other obvious abnormalities. Malaria PCR and tick borne disease PCR panel came back negative    Plan:     Diagnostic Workup:    · Will ask the lab to process the blood cultures for 3 weeks to look for atypical infections  · Continue to follow  fever curve, WBC count and blood cultures  · Follow up on pending workup including  dengue PCR,panel, Zika PCR    Antimicrobials:    · Will continue oral doxycycline 100 mg every 12 hours  · She has completed 3 days treatment of Malarone. No further doses needed  · The patient continues to have fever. I discussed her case with their family friend, Dr. Christine Chakraborty, who is a physician at [facility] at patient's request.  Discussed all the workup with him  · At this time, we are likely that patient will likely need a second opinion, likely an academic center like [facility] or [address] [place] [address]. If the patient will like to stay local, we will recommend getting a secondary opinion from ID team at [facility]  · Discussed the situation with patient and her father in detail. They're agreeable to go to [facility]. Discussed with Dr. Gary Pickens.   The patient has been accepted at [facility] and is waiting for bed  · Will continue empiric doxycycline in the meantime  · Discussed all above with patient and RN  · Plans for possible transfer to [facility] for opinion from the ID team there once a bed is available there for ongoing  High fever of unknown origin    Drug Monitoring:    · Continue monitoring for antibiotic toxicity as follows: CMP  · Continue to watch for following: new or worsening fever, new hypotension, hives, lip swelling and redness or purulence at vascular access sites. I/v access Management:    · Continue to monitor i.v access sites for erythema, induration, discharge or tenderness. · As always, continue efforts to minimize tubes/lines/drains as clinically appropriate to reduce chances of line associated infections. Patient education and counseling:        · The patient was educated in detail about the side-effects of various antibiotics and things to watch for like new rashes, lip swelling, severe reaction, worsening diarrhea, break through fever etc.  · Discussed patient's condition and what to expect. All of the patient's questions were addressed in a satisfactory manner and patient verbalized understanding all instructions. Risk of Complications/Morbidity: High     · Illness(es)/ Infection present that pose threat to life/bodily function. · There is potential for severe exacerbation of infection/side effects of treatment. Doxycyline/minocycline related instructions: Take Doxycyline/minocycline with a full glass of water and stay upright or sitting up after taking it for 30 minutes as it can cause food pipe irritation (pill esophagitis). Use sunscreen when going in bright sun while on Doxycycline/minocycline as it can cause photosensitivity. Take 1 hour before or 2 hour after dairy, calcium, iron, magnesium, aluminum or zinc.    TIME SPENT TODAY:     - Spent over  37  minutes on visit (including interval history, physical exam, review of data including labs, cultures, imaging, development and implementation of treatment plan and coordination of complex care). - Over 50% of time spent with patient face to face on counseling and education. Thank you for involving me in the care of your patient. I will continue to follow. If you have anyadditional questions, please do not hesitate to contact me.     .    Subjective: Vitals:   Vitals:    05/30/19 2239 05/31/19 0001 05/31/19 0515 05/31/19 0753   BP: 104/71  102/68 111/79   Pulse: 105  103 93   Resp: 18  18 18   Temp: 101.6 °F (38.7 °C) 99.4 °F (37.4 °C) 101.9 °F (38.8 °C) 98.7 °F (37.1 °C)   TempSrc: Oral Axillary Oral Oral   SpO2: 97%  98% 95%   Weight:       Height:           Physical Exam   Constitutional: She is oriented to person, place, and time. She appears well-developed. No distress. HENT:   Head: Normocephalic. Right Ear: External ear normal.   Left Ear: External ear normal.   Nose: Nose normal.   Mouth/Throat: Oropharynx is clear and moist.   Eyes: Pupils are equal, round, and reactive to light. Conjunctivae are normal. Right eye exhibits no discharge. Left eye exhibits no discharge. No scleral icterus. Neck: Normal range of motion. Neck supple. Cardiovascular: Normal rate and regular rhythm. Exam reveals no friction rub. No murmur heard. Pulmonary/Chest: Effort normal. No stridor. She has no wheezes. She has no rales. She exhibits no tenderness. Abdominal: Soft. She exhibits no mass. There is no tenderness. There is no rebound and no guarding. Musculoskeletal: She exhibits no edema or tenderness. Lymphadenopathy:     She has no cervical adenopathy. Neurological: She is alert and oriented to person, place, and time. She exhibits normal muscle tone. Skin: Skin is warm and dry. No rash noted. She is not diaphoretic. No erythema. Psychiatric: She has a normal mood and affect. Judgment normal.   Nursing note and vitals reviewed. Lines: All vascular access sites are healthy with no local erythema, discharge or tenderness. Intake and output:    I/O last 3 completed shifts:  In: -   Out: 600 [Urine:600]    Lab Data:   All available labs and old records have been reviewed by me.     CBC:  Recent Labs     05/29/19  0540 05/30/19  0555 05/31/19  0613   WBC 2.8* 2.7* 3.0*   RBC 4.24 4.02 3.97*   HGB 12.5 11.9* 11.9*   HCT 36.4 35.2* 34.5* * 104* 112*   MCV 85.8 87.6 87.0   MCH 29.5 29.6 30.0   MCHC 34.3 33.8 34.5   RDW 12.7 12.8 12.9   BANDSPCT  --  6  --         BMP:  Recent Labs     05/29/19  0540 05/30/19  0555 05/31/19  0613    138 134*   K 4.1 4.5 4.1    101 100   CO2 23 25 24   BUN 4* 6* 7   CREATININE 0.5* 0.6 <0.5*   CALCIUM 9.3 9.3 9.1   GLUCOSE 92 99 116*        Hepatic Function Panel:   Lab Results   Component Value Date    ALKPHOS 184 05/31/2019     05/31/2019     05/31/2019    PROT 7.0 05/31/2019    BILITOT 0.9 05/31/2019    LABALBU 3.8 05/31/2019       CPK: No results found for: CKTOTAL  ESR: No results found for: SEDRATE  CRP: No results found for: CRP        Imaging: All pertinent images and reports for the current visit were reviewed by me during this visit. CT CHEST ABDOMEN PELVIS W CONTRAST   Final Result   1. No evidence of acute cardiopulmonary disease. 2. No evidence of bowel obstruction, intraperitoneal free air, or abscess. No radiographic findings to suggest changes of appendicitis with reservation   that distal portion of the appendix is poorly visualized as described above. 3. Low attenuation about the ovaries suggest presence of underlying   follicular change. Small amount of free fluid within the pelvis. 4. Splenomegaly with spleen measuring approximately 15 cm in maximum AP   diameter as described above. US LIVER SPLEEN   Final Result   Unremarkable right upper quadrant ultrasound. The spleen measures at the   upper limits of normal.         XR CHEST STANDARD (2 VW)   Final Result   No evidence of acute cardiopulmonary disease. Medications: All current and past medications were reviewed.      doxycycline hyclate  100 mg Oral 2 times per day    acetaminophen  650 mg Oral 3 times per day    sodium chloride flush  10 mL Intravenous 2 times per day           acetaminophen, sodium chloride flush, magnesium hydroxide, ondansetron    Current antibiotics:

## 2019-06-01 LAB
A/G RATIO: 1.3 (ref 1.1–2.2)
ALBUMIN SERPL-MCNC: 3.9 G/DL (ref 3.4–5)
ALP BLD-CCNC: 184 U/L (ref 40–129)
ALT SERPL-CCNC: 124 U/L (ref 10–40)
ANION GAP SERPL CALCULATED.3IONS-SCNC: 10 MMOL/L (ref 3–16)
AST SERPL-CCNC: 84 U/L (ref 15–37)
BASOPHILS ABSOLUTE: 0 K/UL (ref 0–0.2)
BASOPHILS RELATIVE PERCENT: 0.9 %
BILIRUB SERPL-MCNC: 0.6 MG/DL (ref 0–1)
BLOOD CULTURE, ROUTINE: NORMAL
BUN BLDV-MCNC: 9 MG/DL (ref 7–20)
CALCIUM SERPL-MCNC: 9.5 MG/DL (ref 8.3–10.6)
CHLORIDE BLD-SCNC: 103 MMOL/L (ref 99–110)
CO2: 25 MMOL/L (ref 21–32)
CREAT SERPL-MCNC: 0.5 MG/DL (ref 0.6–1.1)
CULTURE, BLOOD 2: NORMAL
EOSINOPHILS ABSOLUTE: 0 K/UL (ref 0–0.6)
EOSINOPHILS RELATIVE PERCENT: 1.4 %
GFR AFRICAN AMERICAN: >60
GFR NON-AFRICAN AMERICAN: >60
GLOBULIN: 3.1 G/DL
GLUCOSE BLD-MCNC: 91 MG/DL (ref 70–99)
HCT VFR BLD CALC: 35 % (ref 36–48)
HEMOGLOBIN: 12 G/DL (ref 12–16)
LYMPHOCYTES ABSOLUTE: 1.4 K/UL (ref 1–5.1)
LYMPHOCYTES RELATIVE PERCENT: 41.5 %
MCH RBC QN AUTO: 30 PG (ref 26–34)
MCHC RBC AUTO-ENTMCNC: 34.4 G/DL (ref 31–36)
MCV RBC AUTO: 87.3 FL (ref 80–100)
MONOCYTES ABSOLUTE: 0.5 K/UL (ref 0–1.3)
MONOCYTES RELATIVE PERCENT: 14.9 %
NEUTROPHILS ABSOLUTE: 1.4 K/UL (ref 1.7–7.7)
NEUTROPHILS RELATIVE PERCENT: 41.3 %
PDW BLD-RTO: 12.8 % (ref 12.4–15.4)
PLATELET # BLD: 142 K/UL (ref 135–450)
PMV BLD AUTO: 8.5 FL (ref 5–10.5)
POTASSIUM REFLEX MAGNESIUM: 4.5 MMOL/L (ref 3.5–5.1)
RBC # BLD: 4.01 M/UL (ref 4–5.2)
SODIUM BLD-SCNC: 138 MMOL/L (ref 136–145)
TOTAL PROTEIN: 7 G/DL (ref 6.4–8.2)
WBC # BLD: 3.5 K/UL (ref 4–11)

## 2019-06-01 PROCEDURE — 1200000000 HC SEMI PRIVATE

## 2019-06-01 PROCEDURE — 36415 COLL VENOUS BLD VENIPUNCTURE: CPT

## 2019-06-01 PROCEDURE — 2580000003 HC RX 258: Performed by: INTERNAL MEDICINE

## 2019-06-01 PROCEDURE — 6360000002 HC RX W HCPCS: Performed by: INTERNAL MEDICINE

## 2019-06-01 PROCEDURE — 80053 COMPREHEN METABOLIC PANEL: CPT

## 2019-06-01 PROCEDURE — 99233 SBSQ HOSP IP/OBS HIGH 50: CPT | Performed by: INTERNAL MEDICINE

## 2019-06-01 PROCEDURE — 85025 COMPLETE CBC W/AUTO DIFF WBC: CPT

## 2019-06-01 PROCEDURE — 94760 N-INVAS EAR/PLS OXIMETRY 1: CPT

## 2019-06-01 PROCEDURE — 6370000000 HC RX 637 (ALT 250 FOR IP): Performed by: INTERNAL MEDICINE

## 2019-06-01 RX ORDER — KETOROLAC TROMETHAMINE 30 MG/ML
30 INJECTION, SOLUTION INTRAMUSCULAR; INTRAVENOUS EVERY 6 HOURS PRN
Status: DISCONTINUED | OUTPATIENT
Start: 2019-06-01 | End: 2019-06-02 | Stop reason: HOSPADM

## 2019-06-01 RX ORDER — CYCLOBENZAPRINE HCL 10 MG
10 TABLET ORAL 3 TIMES DAILY PRN
Status: DISCONTINUED | OUTPATIENT
Start: 2019-06-01 | End: 2019-06-02 | Stop reason: HOSPADM

## 2019-06-01 RX ADMIN — KETOROLAC TROMETHAMINE 30 MG: 30 INJECTION, SOLUTION INTRAMUSCULAR at 20:36

## 2019-06-01 RX ADMIN — Medication 10 ML: at 09:51

## 2019-06-01 RX ADMIN — Medication 10 ML: at 20:36

## 2019-06-01 RX ADMIN — DOXYCYCLINE HYCLATE 100 MG: 100 TABLET, COATED ORAL at 09:51

## 2019-06-01 RX ADMIN — DOXYCYCLINE HYCLATE 100 MG: 100 TABLET, COATED ORAL at 20:35

## 2019-06-01 ASSESSMENT — PAIN SCALES - GENERAL
PAINLEVEL_OUTOF10: 1
PAINLEVEL_OUTOF10: 4
PAINLEVEL_OUTOF10: 0
PAINLEVEL_OUTOF10: 7
PAINLEVEL_OUTOF10: 0

## 2019-06-01 ASSESSMENT — PAIN DESCRIPTION - LOCATION: LOCATION: HEAD

## 2019-06-01 ASSESSMENT — PAIN DESCRIPTION - DESCRIPTORS: DESCRIPTORS: HEADACHE

## 2019-06-01 ASSESSMENT — PAIN DESCRIPTION - FREQUENCY: FREQUENCY: INTERMITTENT

## 2019-06-01 ASSESSMENT — PAIN DESCRIPTION - PAIN TYPE: TYPE: ACUTE PAIN

## 2019-06-01 NOTE — PROGRESS NOTES
Hospitalist Progress Note      PCP: No primary care provider on file. Date of Admission: 5/27/2019    Chief Complaint: fever, chills      Subjective:   Afebrile overnight. Denies chills, dyspnea, cough. Some neck  Pain. Medications:  Reviewed    Infusion Medications   Scheduled Medications    doxycycline hyclate  100 mg Oral 2 times per day    sodium chloride flush  10 mL Intravenous 2 times per day     PRN Meds: cyclobenzaprine, ketorolac, acetaminophen, sodium chloride flush, magnesium hydroxide, ondansetron    No intake or output data in the 24 hours ending 06/01/19 1629    Exam:    /71   Pulse 100   Temp 98.3 °F (36.8 °C) (Oral)   Resp 16   Ht 5' 9\" (1.753 m)   Wt 150 lb (68 kg)   LMP 04/28/2019   SpO2 97%   BMI 22.15 kg/m²     Gen/overall appearance: Not in acute distress. Alert. Head: Normocephalic, atraumatic  Eyes: EOMI, no scleral icterus  CVS: regular rate and rhythm, Normal S1S2  Pulm: Clear to auscultation bilaterally. No crackles/wheezes  Gastrointestinal: Soft, nontender, nondistended, no guarding or rebound  Extremities: No edema. No erythema or warmth  Neuro: No gross focal deficits noted  Skin: Warm, dry    Labs:   Recent Labs     05/30/19 0555 05/31/19 0613 06/01/19  0614   WBC 2.7* 3.0* 3.5*   HGB 11.9* 11.9* 12.0   HCT 35.2* 34.5* 35.0*   * 112* 142     Recent Labs     05/30/19  0555 05/31/19  0613 06/01/19  0614    134* 138   K 4.5 4.1 4.5    100 103   CO2 25 24 25   BUN 6* 7 9   CREATININE 0.6 <0.5* 0.5*   CALCIUM 9.3 9.1 9.5     Recent Labs     05/30/19  0555 05/31/19 0613 06/01/19  0614   * 105* 84*   * 133* 124*   BILITOT 0.7 0.9 0.6   ALKPHOS 165* 184* 184*     No results for input(s): INR in the last 72 hours. No results for input(s): Yuly Brown in the last 72 hours.     Assessment/Plan:    Active Hospital Problems    Diagnosis Date Noted    Fever and chills [R50.9] 05/30/2019    Transaminitis [R74.0] 05/29/2019  Leucopenia [D72.819] 05/28/2019    Hx of foreign travel [Z78.9]     Thrombocytopenia (HCC) [D69.6]     Fever [R50.9] 05/27/2019    Fatigue [R53.83] 05/27/2019    Headache [R51] 05/27/2019    Nausea [R11.0] 05/27/2019     Intermittent fever, chills, fatigue, HA with leukopenia and transaminitis. Concerns for acute viral syndrome.   Recent travel overseas to [place].  - appears clinically improving  - ID on board  - EBC, CMV, malaria, tick panel, typhoid fever negative  - s/p course of mararone  - on doxycycline course  - trend leukopenia and transaminitis; improving  - after discussion with ID and pt, will defer XF to CC given clinical improvement    DVT Prophylaxis: ambulatory  Diet: DIET GENERAL;  Code Status: Full Code    Dispo - Codi Singh MD

## 2019-06-01 NOTE — PROGRESS NOTES
Called [facility] with ID regarding pt not being in any type of isolation and if she should be. She stated I should speak with ID MD. Messaged ID on call.  Electronically signed by Naomia Bosworth, RN on 6/1/2019 at 7:59 AM

## 2019-06-01 NOTE — PLAN OF CARE
Problem: Falls - Risk of:  Goal: Will remain free from falls  Description  Will remain free from falls  Outcome: Met This Shift     Problem: Pain:  Goal: Pain level will decrease  Description  Pain level will decrease  Outcome: Ongoing  Note:   Had some neck pain but didn't want any medication

## 2019-06-01 NOTE — PROGRESS NOTES
neg   EBV antibody panel VCA IgM neg, IgGs elevated   BC no growth     Imagin/30 Chest / Abd / PelvisCT   1. No evidence of acute cardiopulmonary disease. 2. No evidence of bowel obstruction, intraperitoneal free air, or abscess. No radiographic findings to suggest changes of appendicitis with reservation   that distal portion of the appendix is poorly visualized as described above. 3. Low attenuation about the ovaries suggest presence of underlying   follicular change.  Small amount of free fluid within the pelvis. 4. Splenomegaly with spleen measuring approximately 15 cm in maximum AP   diameter as described above.  Liver / spleen ultrasound:  Unremarkable right upper quadrant ultrasound.  The spleen measures at the   upper limits of normal.      CXR neg    Scheduled Meds:   doxycycline hyclate  100 mg Oral 2 times per day    acetaminophen  650 mg Oral 3 times per day    sodium chloride flush  10 mL Intravenous 2 times per day       Continuous Infusions:      PRN Meds:  acetaminophen, sodium chloride flush, magnesium hydroxide, ondansetron      Assessment:     20 yo woman with no PMH     Presents with f/c, fatigue, HA, nausea, loss of appetitie. Pt had resp congestion 1-2 week ago, resolved. Developed fever / fatigue, muscle pain 4 days ago. Mild HA  No resp sx. No abd pain. No diarrhea. No rash     Recent travel, in [place] x 9mo, returned 2 mo ago. Had unknown immunization prior to travel. No malaria prophylaxis. (5 mo ago had tick bite with spotted fever illness [fever, aches, lesion at bite, treated with doxycycline and illness resolved). + recent sick contact - friend had resp sx,  with resp sx.  2 yo in household well     Admit  - Tm 103.2. WBC 2.4, plt 109k. UA neg. CXR neg.    Testing as above    IMP/  Fever - leukopenia - recent travel      Viral syndrome (returned end of March, longer incubation than would see with may common viral illnesses

## 2019-06-01 NOTE — PROGRESS NOTES
Spoke with ID. Stated pt does not need to be in any isolation precautions.  Electronically signed by Sumi Brizuela RN on 6/1/2019 at 10:02 AM

## 2019-06-02 VITALS
DIASTOLIC BLOOD PRESSURE: 66 MMHG | SYSTOLIC BLOOD PRESSURE: 102 MMHG | BODY MASS INDEX: 23.55 KG/M2 | HEART RATE: 68 BPM | WEIGHT: 159 LBS | HEIGHT: 69 IN | OXYGEN SATURATION: 96 % | RESPIRATION RATE: 16 BRPM | TEMPERATURE: 99 F

## 2019-06-02 LAB
A/G RATIO: 1.1 (ref 1.1–2.2)
ALBUMIN SERPL-MCNC: 3.6 G/DL (ref 3.4–5)
ALP BLD-CCNC: 180 U/L (ref 40–129)
ALT SERPL-CCNC: 98 U/L (ref 10–40)
ANION GAP SERPL CALCULATED.3IONS-SCNC: 10 MMOL/L (ref 3–16)
AST SERPL-CCNC: 53 U/L (ref 15–37)
BASOPHILS ABSOLUTE: 0 K/UL (ref 0–0.2)
BASOPHILS RELATIVE PERCENT: 0.8 %
BILIRUB SERPL-MCNC: 0.6 MG/DL (ref 0–1)
BUN BLDV-MCNC: 12 MG/DL (ref 7–20)
CALCIUM SERPL-MCNC: 9.1 MG/DL (ref 8.3–10.6)
CHLORIDE BLD-SCNC: 104 MMOL/L (ref 99–110)
CO2: 24 MMOL/L (ref 21–32)
CREAT SERPL-MCNC: 0.5 MG/DL (ref 0.6–1.1)
EOSINOPHILS ABSOLUTE: 0.1 K/UL (ref 0–0.6)
EOSINOPHILS RELATIVE PERCENT: 1.5 %
GFR AFRICAN AMERICAN: >60
GFR NON-AFRICAN AMERICAN: >60
GLOBULIN: 3.2 G/DL
GLUCOSE BLD-MCNC: 100 MG/DL (ref 70–99)
HCT VFR BLD CALC: 35.1 % (ref 36–48)
HEMOGLOBIN: 11.9 G/DL (ref 12–16)
LYMPHOCYTES ABSOLUTE: 1.4 K/UL (ref 1–5.1)
LYMPHOCYTES RELATIVE PERCENT: 32.8 %
MCH RBC QN AUTO: 29.6 PG (ref 26–34)
MCHC RBC AUTO-ENTMCNC: 33.8 G/DL (ref 31–36)
MCV RBC AUTO: 87.7 FL (ref 80–100)
MONOCYTES ABSOLUTE: 0.6 K/UL (ref 0–1.3)
MONOCYTES RELATIVE PERCENT: 13 %
NEUTROPHILS ABSOLUTE: 2.3 K/UL (ref 1.7–7.7)
NEUTROPHILS RELATIVE PERCENT: 51.9 %
PDW BLD-RTO: 13 % (ref 12.4–15.4)
PLATELET # BLD: 199 K/UL (ref 135–450)
PMV BLD AUTO: 8.2 FL (ref 5–10.5)
POTASSIUM REFLEX MAGNESIUM: 4.6 MMOL/L (ref 3.5–5.1)
RBC # BLD: 4 M/UL (ref 4–5.2)
SODIUM BLD-SCNC: 138 MMOL/L (ref 136–145)
TOTAL PROTEIN: 6.8 G/DL (ref 6.4–8.2)
WBC # BLD: 4.4 K/UL (ref 4–11)

## 2019-06-02 PROCEDURE — 36415 COLL VENOUS BLD VENIPUNCTURE: CPT

## 2019-06-02 PROCEDURE — 2580000003 HC RX 258: Performed by: INTERNAL MEDICINE

## 2019-06-02 PROCEDURE — 6370000000 HC RX 637 (ALT 250 FOR IP): Performed by: INTERNAL MEDICINE

## 2019-06-02 PROCEDURE — 80053 COMPREHEN METABOLIC PANEL: CPT

## 2019-06-02 PROCEDURE — 85025 COMPLETE CBC W/AUTO DIFF WBC: CPT

## 2019-06-02 RX ORDER — CYCLOBENZAPRINE HCL 10 MG
10 TABLET ORAL 3 TIMES DAILY PRN
Qty: 15 TABLET | Refills: 0 | Status: SHIPPED | OUTPATIENT
Start: 2019-06-02 | End: 2019-06-07

## 2019-06-02 RX ADMIN — DOXYCYCLINE HYCLATE 100 MG: 100 TABLET, COATED ORAL at 09:54

## 2019-06-02 RX ADMIN — Medication 10 ML: at 09:54

## 2019-06-02 ASSESSMENT — PAIN SCALES - GENERAL
PAINLEVEL_OUTOF10: 0
PAINLEVEL_OUTOF10: 0

## 2019-06-02 NOTE — PROGRESS NOTES
Discharge instructions reviewed with pt and spouse. Pt pharmacy is actually [facility]. Kavon Lund to transfer scripts. Pt and spouse verbalize understanding of instructions and deny additional needs. Pt discharged to home with spouse via private car.

## 2019-06-02 NOTE — PLAN OF CARE
Problem: Pain:  Goal: Pain level will decrease  Description  Pain level will decrease  6/1/2019 2256 by Mihai Saenz RN  Outcome: Ongoing  Note:   Non pharmacological interventions provided for pain and will medicate for pain per eMAR order when non pharmacological interventions not effective anymore during shift.

## 2019-06-02 NOTE — FLOWSHEET NOTE
Patient alert and oriented. Lungs clear to auscultation, breathing even and unlabored, chest expansion symmetrical, no noted use of accessory muscles, and patient denies any SOB. Bowel sounds present in all quadrants, patient denies any nausea, vomiting and diarrhea at this time. Peripheral pulses present in all extremities, skin intact and no area of concern to note at this time. Patient c/o pain, ketorolac given per eMAR order. Will continue to monitor temperature during shift.  Assist to a comfortable position in bed and call light within reach.     06/01/19 2015   Vital Signs   Temp 99.3 °F (37.4 °C)   Temp Source Oral   Pulse 84   Heart Rate Source Brachial   Resp 18   /73   BP Location Left upper arm   MAP (mmHg) 86   Patient Position Semi fowlers   Level of Consciousness 0   MEWS Score 1   Patient Currently in Pain Yes   Pain Assessment   Pain Assessment 0-10   Pain Level 4   Oxygen Therapy   SpO2 98 %   O2 Device None (Room air)

## 2019-06-02 NOTE — DISCHARGE SUMMARY
Hospital Medicine Discharge Summary    Patient ID: Hilliard Landau      Patient's PCP: No primary care provider on file. Admit Date: 5/27/2019     Discharge Date: 6/2/2019    Admitting Physician: Yanet Lora MD     Discharge Physician: Becca Jones MD     Discharge Diagnoses and Hospital Course: Active Hospital Problems    Diagnosis Date Noted    Fever and chills [R50.9] 05/30/2019    Transaminitis [R74.0] 05/29/2019    Leucopenia [D72.819] 05/28/2019    Hx of foreign travel [Z78.9]     Thrombocytopenia (HCC) [D69.6]     Fever [R50.9] 05/27/2019    Fatigue [R53.83] 05/27/2019    Headache [R51] 05/27/2019    Nausea [R11.0] 05/27/2019     Intermittent fever, chills, fatigue, HA with leukopenia and transaminitis. Concerns for acute viral syndrome. Recent travel overseas to [place].  - appears clinically improving  - ID on board  - EBC, CMV, malaria, tick panel, typhoid fever negative  - s/p course of mararone  - on doxycycline course; continue on discharge  - leukopenia and transaminitis improving  - after discussion with ID and pt, deferred XF to CC given clinical improvement    Neck pain; now resolved. Likely MSK.  - given trial of flexeril    The patient was seen and examined on day of discharge and this discharge summary is in conjunction with any daily progress note from day of discharge. Exam:     /66   Pulse 68   Temp 99 °F (37.2 °C) (Oral)   Resp 16   Ht 5' 9\" (1.753 m)   Wt 159 lb (72.1 kg)   LMP 04/28/2019   SpO2 96%   BMI 23.48 kg/m²     Gen/overall appearance: Not in acute distress. Alert. Head: Normocephalic, atraumatic  Eyes: EOMI, no scleral icterus  CVS: regular rate and rhythm, Normal S1S2  Pulm: Clear to auscultation bilaterally. No crackles/wheezes  Gastrointestinal: Soft, nontender, nondistended, no guarding or rebound  Extremities: No edema.  No erythema or warmth  Neuro: No gross focal deficits noted  Skin: Warm, dry, turgor normal      Consults:

## 2019-06-04 ENCOUNTER — TELEPHONE (OUTPATIENT)
Dept: INFECTIOUS DISEASES | Age: 24
End: 2019-06-04

## 2019-06-04 DIAGNOSIS — R50.9 FEVER, UNSPECIFIED FEVER CAUSE: Primary | ICD-10-CM

## 2019-06-04 DIAGNOSIS — D70.9 NEUTROPENIA, UNSPECIFIED TYPE (HCC): ICD-10-CM

## 2019-06-04 LAB
BLOOD CULTURE, ROUTINE: NORMAL
MISCELLANEOUS LAB TEST ORDER: NORMAL
MISCELLANEOUS LAB TEST ORDER: NORMAL

## 2020-04-13 LAB
ABO/RH: NORMAL
ANTIBODY SCREEN: NORMAL
HEPATITIS C ANTIBODY INTERPRETATION: NORMAL

## 2020-04-14 LAB
BASOPHILS ABSOLUTE: 0 K/UL (ref 0–0.2)
BASOPHILS RELATIVE PERCENT: 0.6 %
EOSINOPHILS ABSOLUTE: 0.1 K/UL (ref 0–0.6)
EOSINOPHILS RELATIVE PERCENT: 0.8 %
HCT VFR BLD CALC: 38 % (ref 36–48)
HEMOGLOBIN: 12.9 G/DL (ref 12–16)
HEPATITIS B SURFACE ANTIGEN INTERPRETATION: NORMAL
HIV AG/AB: NORMAL
HIV ANTIGEN: NORMAL
HIV-1 ANTIBODY: NORMAL
HIV-2 AB: NORMAL
LYMPHOCYTES ABSOLUTE: 1.8 K/UL (ref 1–5.1)
LYMPHOCYTES RELATIVE PERCENT: 26.4 %
MCH RBC QN AUTO: 30.5 PG (ref 26–34)
MCHC RBC AUTO-ENTMCNC: 33.9 G/DL (ref 31–36)
MCV RBC AUTO: 89.9 FL (ref 80–100)
MONOCYTES ABSOLUTE: 0.4 K/UL (ref 0–1.3)
MONOCYTES RELATIVE PERCENT: 5.5 %
NEUTROPHILS ABSOLUTE: 4.7 K/UL (ref 1.7–7.7)
NEUTROPHILS RELATIVE PERCENT: 66.7 %
PDW BLD-RTO: 12.6 % (ref 12.4–15.4)
PLATELET # BLD: 274 K/UL (ref 135–450)
PMV BLD AUTO: 8.6 FL (ref 5–10.5)
RBC # BLD: 4.23 M/UL (ref 4–5.2)
RUBELLA ANTIBODY IGG: 219.7 IU/ML
TOTAL SYPHILLIS IGG/IGM: NORMAL
WBC # BLD: 7 K/UL (ref 4–11)

## 2023-06-10 ENCOUNTER — HOSPITAL ENCOUNTER (EMERGENCY)
Age: 28
Discharge: HOME | End: 2023-06-10
Payer: COMMERCIAL

## 2023-06-10 VITALS
SYSTOLIC BLOOD PRESSURE: 141 MMHG | TEMPERATURE: 98.2 F | DIASTOLIC BLOOD PRESSURE: 101 MMHG | OXYGEN SATURATION: 100 % | HEART RATE: 85 BPM | RESPIRATION RATE: 18 BRPM

## 2023-06-10 DIAGNOSIS — H66.001 ACUTE SUPPURATIVE OTITIS MEDIA OF RIGHT EAR WITHOUT SPONTANEOUS RUPTURE OF TYMPANIC MEMBRANE, RECURRENCE NOT SPECIFIED: Primary | ICD-10-CM

## 2023-06-10 PROCEDURE — 6370000000 HC RX 637 (ALT 250 FOR IP): Performed by: PHYSICIAN ASSISTANT

## 2023-06-10 RX ORDER — AMOXICILLIN AND CLAVULANATE POTASSIUM 875; 125 MG/1; MG/1
1 TABLET, FILM COATED ORAL 2 TIMES DAILY
Qty: 20 TABLET | Refills: 0 | Status: SHIPPED | OUTPATIENT
Start: 2023-06-10 | End: 2023-06-20

## 2023-06-10 RX ORDER — AMOXICILLIN AND CLAVULANATE POTASSIUM 875; 125 MG/1; MG/1
1 TABLET, FILM COATED ORAL EVERY 12 HOURS SCHEDULED
Status: DISCONTINUED | OUTPATIENT
Start: 2023-06-10 | End: 2023-06-10 | Stop reason: HOSPADM

## 2023-06-10 RX ADMIN — AMOXICILLIN AND CLAVULANATE POTASSIUM 1 TABLET: 875; 125 TABLET, FILM COATED ORAL at 21:14

## 2023-06-11 ASSESSMENT — ENCOUNTER SYMPTOMS
ABDOMINAL PAIN: 0
DIARRHEA: 0
EYE PAIN: 0
VOMITING: 0
NAUSEA: 0
BACK PAIN: 0
SHORTNESS OF BREATH: 0
SORE THROAT: 0
CONSTIPATION: 0
COUGH: 0
RHINORRHEA: 0